# Patient Record
Sex: MALE | Race: BLACK OR AFRICAN AMERICAN | NOT HISPANIC OR LATINO | Employment: UNEMPLOYED | URBAN - METROPOLITAN AREA
[De-identification: names, ages, dates, MRNs, and addresses within clinical notes are randomized per-mention and may not be internally consistent; named-entity substitution may affect disease eponyms.]

---

## 2023-02-15 PROBLEM — I25.10 CORONARY ARTERY DISEASE INVOLVING NATIVE CORONARY ARTERY OF NATIVE HEART WITHOUT ANGINA PECTORIS: Status: ACTIVE | Noted: 2023-02-15

## 2023-02-15 PROBLEM — N18.32 STAGE 3B CHRONIC KIDNEY DISEASE (MULTI): Status: ACTIVE | Noted: 2023-02-15

## 2023-02-15 PROBLEM — R79.89 ELEVATED SERUM CREATININE: Status: ACTIVE | Noted: 2023-02-15

## 2023-02-15 PROBLEM — K76.0 FATTY LIVER: Status: ACTIVE | Noted: 2023-02-15

## 2023-02-15 PROBLEM — I50.22 ACC/AHA STAGE C CHRONIC SYSTOLIC HEART FAILURE (MULTI): Status: ACTIVE | Noted: 2023-02-15

## 2023-02-15 PROBLEM — I42.8 NON-ISCHEMIC CARDIOMYOPATHY (MULTI): Status: ACTIVE | Noted: 2023-02-15

## 2023-02-15 PROBLEM — N18.4 CHRONIC KIDNEY DISEASE, STAGE 4 (SEVERE) (MULTI): Status: ACTIVE | Noted: 2023-02-15

## 2023-02-15 PROBLEM — N42.81 PROSTATALGIA: Status: ACTIVE | Noted: 2023-02-15

## 2023-02-15 PROBLEM — I50.9 CHF (CONGESTIVE HEART FAILURE) (MULTI): Status: ACTIVE | Noted: 2023-02-15

## 2023-02-15 PROBLEM — K57.92 ACUTE DIVERTICULITIS: Status: ACTIVE | Noted: 2023-02-15

## 2023-02-15 RX ORDER — TORSEMIDE 10 MG/1
0.5 TABLET ORAL DAILY
COMMUNITY
Start: 2022-08-18 | End: 2023-06-23 | Stop reason: SDUPTHER

## 2023-02-15 RX ORDER — POTASSIUM CHLORIDE 20 MEQ/1
1 TABLET, EXTENDED RELEASE ORAL DAILY
COMMUNITY
Start: 2022-08-18 | End: 2023-03-29 | Stop reason: ALTCHOICE

## 2023-02-15 RX ORDER — NAPROXEN SODIUM 220 MG/1
TABLET, FILM COATED ORAL
COMMUNITY
Start: 2022-08-25

## 2023-02-15 RX ORDER — ATORVASTATIN CALCIUM 40 MG/1
1 TABLET, FILM COATED ORAL NIGHTLY
COMMUNITY
Start: 2022-08-18 | End: 2023-03-29 | Stop reason: SDUPTHER

## 2023-02-15 RX ORDER — SACUBITRIL AND VALSARTAN 49; 51 MG/1; MG/1
1 TABLET, FILM COATED ORAL 2 TIMES DAILY
COMMUNITY
End: 2023-06-23 | Stop reason: SDUPTHER

## 2023-02-15 RX ORDER — DAPAGLIFLOZIN 10 MG/1
1 TABLET, FILM COATED ORAL DAILY
COMMUNITY
Start: 2022-08-18 | End: 2023-06-23 | Stop reason: SDUPTHER

## 2023-02-15 RX ORDER — METOPROLOL SUCCINATE 25 MG/1
0.5 TABLET, EXTENDED RELEASE ORAL DAILY
COMMUNITY
Start: 2022-09-21 | End: 2023-06-23 | Stop reason: SDUPTHER

## 2023-02-15 RX ORDER — SPIRONOLACTONE 25 MG/1
0.5 TABLET ORAL DAILY
COMMUNITY
Start: 2022-08-18 | End: 2023-06-23 | Stop reason: SDUPTHER

## 2023-02-25 LAB
ALBUMIN (G/DL) IN SER/PLAS: 4.8 G/DL (ref 3.4–5)
ALBUMIN (MG/L) IN URINE: <7 MG/L
ALBUMIN/CREATININE (UG/MG) IN URINE: NORMAL UG/MG CRT (ref 0–30)
ANION GAP IN SER/PLAS: 15 MMOL/L (ref 10–20)
CALCIDIOL (25 OH VITAMIN D3) (NG/ML) IN SER/PLAS: 23 NG/ML
CALCIUM (MG/DL) IN SER/PLAS: 9.9 MG/DL (ref 8.6–10.6)
CARBON DIOXIDE, TOTAL (MMOL/L) IN SER/PLAS: 24 MMOL/L (ref 21–32)
CHLORIDE (MMOL/L) IN SER/PLAS: 101 MMOL/L (ref 98–107)
CREATININE (MG/DL) IN SER/PLAS: 2.44 MG/DL (ref 0.5–1.3)
CREATININE (MG/DL) IN URINE: 34.9 MG/DL (ref 20–370)
ERYTHROCYTE DISTRIBUTION WIDTH (RATIO) BY AUTOMATED COUNT: 14.2 % (ref 11.5–14.5)
ERYTHROCYTE MEAN CORPUSCULAR HEMOGLOBIN CONCENTRATION (G/DL) BY AUTOMATED: 32 G/DL (ref 32–36)
ERYTHROCYTE MEAN CORPUSCULAR VOLUME (FL) BY AUTOMATED COUNT: 87 FL (ref 80–100)
ERYTHROCYTES (10*6/UL) IN BLOOD BY AUTOMATED COUNT: 5.84 X10E12/L (ref 4.5–5.9)
GFR MALE: 28 ML/MIN/1.73M2
GLUCOSE (MG/DL) IN SER/PLAS: 137 MG/DL (ref 74–99)
HEMATOCRIT (%) IN BLOOD BY AUTOMATED COUNT: 50.9 % (ref 41–52)
HEMOGLOBIN (G/DL) IN BLOOD: 16.3 G/DL (ref 13.5–17.5)
LEUKOCYTES (10*3/UL) IN BLOOD BY AUTOMATED COUNT: 6.1 X10E9/L (ref 4.4–11.3)
NRBC (PER 100 WBCS) BY AUTOMATED COUNT: 0 /100 WBC (ref 0–0)
PARATHYRIN INTACT (PG/ML) IN SER/PLAS: 101.5 PG/ML (ref 18.5–88)
PHOSPHATE (MG/DL) IN SER/PLAS: 4.1 MG/DL (ref 2.5–4.9)
PLATELETS (10*3/UL) IN BLOOD AUTOMATED COUNT: 214 X10E9/L (ref 150–450)
POTASSIUM (MMOL/L) IN SER/PLAS: 4.7 MMOL/L (ref 3.5–5.3)
SODIUM (MMOL/L) IN SER/PLAS: 135 MMOL/L (ref 136–145)
UREA NITROGEN (MG/DL) IN SER/PLAS: 39 MG/DL (ref 6–23)

## 2023-03-29 ENCOUNTER — OFFICE VISIT (OUTPATIENT)
Dept: PRIMARY CARE | Facility: CLINIC | Age: 68
End: 2023-03-29
Payer: MEDICARE

## 2023-03-29 VITALS
HEART RATE: 99 BPM | DIASTOLIC BLOOD PRESSURE: 72 MMHG | BODY MASS INDEX: 32.5 KG/M2 | WEIGHT: 227 LBS | HEIGHT: 70 IN | SYSTOLIC BLOOD PRESSURE: 127 MMHG | TEMPERATURE: 98.3 F

## 2023-03-29 DIAGNOSIS — N18.4 CHRONIC KIDNEY DISEASE, STAGE 4 (SEVERE) (MULTI): ICD-10-CM

## 2023-03-29 DIAGNOSIS — Z23 NEED FOR VACCINATION AGAINST STREPTOCOCCUS PNEUMONIAE: ICD-10-CM

## 2023-03-29 DIAGNOSIS — Z12.11 SCREENING FOR COLON CANCER: ICD-10-CM

## 2023-03-29 DIAGNOSIS — Z12.5 SCREENING FOR PROSTATE CANCER: ICD-10-CM

## 2023-03-29 DIAGNOSIS — Z00.00 ANNUAL PHYSICAL EXAM: Primary | ICD-10-CM

## 2023-03-29 DIAGNOSIS — I50.9 CHRONIC CONGESTIVE HEART FAILURE, UNSPECIFIED HEART FAILURE TYPE (MULTI): ICD-10-CM

## 2023-03-29 DIAGNOSIS — N18.32 STAGE 3B CHRONIC KIDNEY DISEASE (MULTI): ICD-10-CM

## 2023-03-29 PROCEDURE — G0439 PPPS, SUBSEQ VISIT: HCPCS | Performed by: INTERNAL MEDICINE

## 2023-03-29 PROCEDURE — 1036F TOBACCO NON-USER: CPT | Performed by: INTERNAL MEDICINE

## 2023-03-29 PROCEDURE — 90677 PCV20 VACCINE IM: CPT | Performed by: INTERNAL MEDICINE

## 2023-03-29 PROCEDURE — 1170F FXNL STATUS ASSESSED: CPT | Performed by: INTERNAL MEDICINE

## 2023-03-29 PROCEDURE — 1159F MED LIST DOCD IN RCRD: CPT | Performed by: INTERNAL MEDICINE

## 2023-03-29 PROCEDURE — G0009 ADMIN PNEUMOCOCCAL VACCINE: HCPCS | Performed by: INTERNAL MEDICINE

## 2023-03-29 RX ORDER — ATORVASTATIN CALCIUM 40 MG/1
40 TABLET, FILM COATED ORAL NIGHTLY
Qty: 90 TABLET | Refills: 3 | Status: SHIPPED | OUTPATIENT
Start: 2023-03-29 | End: 2023-06-23 | Stop reason: SDUPTHER

## 2023-03-29 ASSESSMENT — ENCOUNTER SYMPTOMS
DEPRESSION: 0
POLYDIPSIA: 0
OCCASIONAL FEELINGS OF UNSTEADINESS: 0
SHORTNESS OF BREATH: 0
CHILLS: 0
LOSS OF SENSATION IN FEET: 0
COUGH: 0
FEVER: 0
PALPITATIONS: 0

## 2023-03-29 ASSESSMENT — PAIN SCALES - GENERAL: PAINLEVEL: 4

## 2023-03-29 ASSESSMENT — PATIENT HEALTH QUESTIONNAIRE - PHQ9
SUM OF ALL RESPONSES TO PHQ9 QUESTIONS 1 AND 2: 0
2. FEELING DOWN, DEPRESSED OR HOPELESS: NOT AT ALL
1. LITTLE INTEREST OR PLEASURE IN DOING THINGS: NOT AT ALL

## 2023-03-29 ASSESSMENT — ACTIVITIES OF DAILY LIVING (ADL)
DOING_HOUSEWORK: INDEPENDENT
BATHING: INDEPENDENT
TAKING_MEDICATION: INDEPENDENT
DRESSING: INDEPENDENT
MANAGING_FINANCES: INDEPENDENT
GROCERY_SHOPPING: INDEPENDENT

## 2023-03-29 NOTE — PROGRESS NOTES
"Subjective   Reason for Visit: Sebastian Jean Baptiste is an 67 y.o. male here for a Medicare Wellness visit.          Reviewed all medications by prescribing practitioner or clinical pharmacist (such as prescriptions, OTCs, herbal therapies and supplements) and documented in the medical record.    Patient presents today for an annual wellness exam    Medical history and surgical history updated today  Medication list reconciled and updated  Patient denies vision issues at this time  Patient denies hearing issues at this time  Current diet: elizabeth;/cardiac  Current exercise habit: yes, intense work  Follows with a dentist: Yes    Patient counseled about available preventative health vaccinations and provided with opportunity to update them with our office or through prescription to preferred pharmacy.    Reviewed and discussed preventative health screening recommendations for colon cancer    Reviewed and discussed preventative health recommendations for screening for prostate cancer          Patient Care Team:  Montez Butt MD as PCP - General     Review of Systems   Constitutional:  Negative for chills and fever.   Respiratory:  Negative for cough and shortness of breath.    Cardiovascular:  Negative for chest pain and palpitations.   Endocrine: Negative for polydipsia and polyuria.       Objective   Vitals:  /72 (BP Location: Left arm, Patient Position: Sitting, BP Cuff Size: Adult)   Pulse 99   Temp 36.8 °C (98.3 °F) (Oral)   Ht 1.778 m (5' 10\")   Wt 103 kg (227 lb)   BMI 32.57 kg/m²       Physical Exam  Constitutional:       Appearance: Normal appearance.   HENT:      Head: Normocephalic and atraumatic.      Right Ear: Tympanic membrane normal.      Left Ear: Tympanic membrane normal.      Nose: Nose normal.      Mouth/Throat:      Mouth: Mucous membranes are moist.   Eyes:      Extraocular Movements: Extraocular movements intact.      Conjunctiva/sclera: Conjunctivae normal.      Pupils: Pupils are equal, " round, and reactive to light.   Neck:      Thyroid: No thyroid mass, thyromegaly or thyroid tenderness.      Vascular: No carotid bruit.   Cardiovascular:      Rate and Rhythm: Normal rate and regular rhythm.      Heart sounds: No murmur heard.     No friction rub. No gallop.   Pulmonary:      Effort: Pulmonary effort is normal. No respiratory distress.      Breath sounds: No wheezing, rhonchi or rales.   Abdominal:      General: Bowel sounds are normal.      Palpations: Abdomen is soft.      Tenderness: There is no abdominal tenderness. There is no guarding.      Hernia: A hernia (umbilical) is present.   Musculoskeletal:      Cervical back: Neck supple. No tenderness.      Right lower leg: No edema.      Left lower leg: No edema.   Lymphadenopathy:      Cervical: No cervical adenopathy.   Skin:     Coloration: Skin is not jaundiced.   Neurological:      General: No focal deficit present.      Mental Status: He is alert and oriented to person, place, and time.   Psychiatric:         Mood and Affect: Mood normal.         Assessment/Plan   Problem List Items Addressed This Visit          Circulatory    CHF (congestive heart failure) (CMS/HCC)    Relevant Medications    atorvastatin (Lipitor) 40 mg tablet       Genitourinary    Chronic kidney disease, stage 4 (severe) (CMS/HCC)    Stage 3b chronic kidney disease    Relevant Medications    atorvastatin (Lipitor) 40 mg tablet     Other Visit Diagnoses       Annual physical exam    -  Primary    Relevant Orders    Colonoscopy    Prostate Spec.Ag,Screen    Screening for colon cancer        Relevant Orders    Colonoscopy    Screening for prostate cancer        Relevant Orders    Prostate Spec.Ag,Screen    Need for vaccination against Streptococcus pneumoniae

## 2023-06-02 ENCOUNTER — LAB (OUTPATIENT)
Dept: LAB | Facility: LAB | Age: 68
End: 2023-06-02
Payer: MEDICARE

## 2023-06-02 DIAGNOSIS — Z00.00 ANNUAL PHYSICAL EXAM: ICD-10-CM

## 2023-06-02 DIAGNOSIS — Z12.5 SCREENING FOR PROSTATE CANCER: ICD-10-CM

## 2023-06-02 LAB
ALBUMIN (G/DL) IN SER/PLAS: 4.4 G/DL (ref 3.4–5)
ALBUMIN (MG/L) IN URINE: <7 MG/L
ALBUMIN/CREATININE (UG/MG) IN URINE: NORMAL UG/MG CRT (ref 0–30)
ANION GAP IN SER/PLAS: 16 MMOL/L (ref 10–20)
CALCIDIOL (25 OH VITAMIN D3) (NG/ML) IN SER/PLAS: 59 NG/ML
CALCIUM (MG/DL) IN SER/PLAS: 9 MG/DL (ref 8.6–10.6)
CARBON DIOXIDE, TOTAL (MMOL/L) IN SER/PLAS: 23 MMOL/L (ref 21–32)
CHLORIDE (MMOL/L) IN SER/PLAS: 105 MMOL/L (ref 98–107)
CREATININE (MG/DL) IN SER/PLAS: 2.91 MG/DL (ref 0.5–1.3)
CREATININE (MG/DL) IN URINE: 102 MG/DL (ref 20–370)
GFR MALE: 23 ML/MIN/1.73M2
GLUCOSE (MG/DL) IN SER/PLAS: 105 MG/DL (ref 74–99)
PARATHYRIN INTACT (PG/ML) IN SER/PLAS: 84.5 PG/ML (ref 18.5–88)
PHOSPHATE (MG/DL) IN SER/PLAS: 4.7 MG/DL (ref 2.5–4.9)
POTASSIUM (MMOL/L) IN SER/PLAS: 4.5 MMOL/L (ref 3.5–5.3)
PROSTATE SPECIFIC ANTIGEN,SCREEN: 1.5 NG/ML (ref 0–4)
SODIUM (MMOL/L) IN SER/PLAS: 139 MMOL/L (ref 136–145)
UREA NITROGEN (MG/DL) IN SER/PLAS: 43 MG/DL (ref 6–23)

## 2023-06-02 PROCEDURE — G0103 PSA SCREENING: HCPCS

## 2023-06-02 PROCEDURE — 36415 COLL VENOUS BLD VENIPUNCTURE: CPT

## 2023-06-23 DIAGNOSIS — N18.32 STAGE 3B CHRONIC KIDNEY DISEASE (MULTI): ICD-10-CM

## 2023-06-23 DIAGNOSIS — I50.9 CHRONIC CONGESTIVE HEART FAILURE, UNSPECIFIED HEART FAILURE TYPE (MULTI): ICD-10-CM

## 2023-06-23 RX ORDER — TORSEMIDE 10 MG/1
5 TABLET ORAL DAILY
Qty: 60 TABLET | Refills: 2 | Status: SHIPPED | OUTPATIENT
Start: 2023-06-23 | End: 2023-09-05

## 2023-06-23 RX ORDER — METOPROLOL SUCCINATE 25 MG/1
12.5 TABLET, EXTENDED RELEASE ORAL DAILY
Qty: 30 TABLET | Refills: 2 | Status: SHIPPED | OUTPATIENT
Start: 2023-06-23 | End: 2023-11-09

## 2023-06-23 RX ORDER — SACUBITRIL AND VALSARTAN 49; 51 MG/1; MG/1
1 TABLET, FILM COATED ORAL 2 TIMES DAILY
Qty: 180 TABLET | Refills: 2 | Status: SHIPPED | OUTPATIENT
Start: 2023-06-23 | End: 2023-12-12 | Stop reason: SDUPTHER

## 2023-06-23 RX ORDER — SPIRONOLACTONE 25 MG/1
12.5 TABLET ORAL DAILY
Qty: 90 TABLET | Refills: 3 | Status: SHIPPED | OUTPATIENT
Start: 2023-06-23 | End: 2023-12-12 | Stop reason: SDUPTHER

## 2023-06-23 RX ORDER — DAPAGLIFLOZIN 10 MG/1
10 TABLET, FILM COATED ORAL DAILY
Qty: 90 TABLET | Refills: 2 | Status: SHIPPED | OUTPATIENT
Start: 2023-06-23 | End: 2023-12-12 | Stop reason: SDUPTHER

## 2023-06-23 RX ORDER — ATORVASTATIN CALCIUM 40 MG/1
40 TABLET, FILM COATED ORAL NIGHTLY
Qty: 90 TABLET | Refills: 3 | Status: SHIPPED | OUTPATIENT
Start: 2023-06-23 | End: 2023-12-12 | Stop reason: SDUPTHER

## 2023-08-29 DIAGNOSIS — I50.9 CHRONIC CONGESTIVE HEART FAILURE, UNSPECIFIED HEART FAILURE TYPE (MULTI): ICD-10-CM

## 2023-08-29 DIAGNOSIS — N18.32 STAGE 3B CHRONIC KIDNEY DISEASE (MULTI): ICD-10-CM

## 2023-09-05 RX ORDER — TORSEMIDE 10 MG/1
5 TABLET ORAL DAILY
Qty: 30 TABLET | Refills: 0 | Status: SHIPPED | OUTPATIENT
Start: 2023-09-05 | End: 2023-12-12 | Stop reason: SDUPTHER

## 2023-09-25 LAB
ALBUMIN (G/DL) IN SER/PLAS: 4.8 G/DL (ref 3.4–5)
ALBUMIN (MG/L) IN URINE: 7.5 MG/L
ALBUMIN/CREATININE (UG/MG) IN URINE: 21.1 UG/MG CRT (ref 0–30)
ANION GAP IN SER/PLAS: 16 MMOL/L (ref 10–20)
CALCIDIOL (25 OH VITAMIN D3) (NG/ML) IN SER/PLAS: 28 NG/ML
CALCIUM (MG/DL) IN SER/PLAS: 10.2 MG/DL (ref 8.6–10.6)
CARBON DIOXIDE, TOTAL (MMOL/L) IN SER/PLAS: 25 MMOL/L (ref 21–32)
CHLORIDE (MMOL/L) IN SER/PLAS: 103 MMOL/L (ref 98–107)
CREATININE (MG/DL) IN SER/PLAS: 2.14 MG/DL (ref 0.5–1.3)
CREATININE (MG/DL) IN URINE: 35.6 MG/DL (ref 20–370)
GFR MALE: 33 ML/MIN/1.73M2
GLUCOSE (MG/DL) IN SER/PLAS: 93 MG/DL (ref 74–99)
PARATHYRIN INTACT (PG/ML) IN SER/PLAS: 48.8 PG/ML (ref 18.5–88)
PHOSPHATE (MG/DL) IN SER/PLAS: 3.7 MG/DL (ref 2.5–4.9)
POTASSIUM (MMOL/L) IN SER/PLAS: 4.4 MMOL/L (ref 3.5–5.3)
SODIUM (MMOL/L) IN SER/PLAS: 140 MMOL/L (ref 136–145)
UREA NITROGEN (MG/DL) IN SER/PLAS: 28 MG/DL (ref 6–23)

## 2023-11-09 DIAGNOSIS — N18.32 STAGE 3B CHRONIC KIDNEY DISEASE (MULTI): ICD-10-CM

## 2023-11-09 DIAGNOSIS — I50.9 CHRONIC CONGESTIVE HEART FAILURE, UNSPECIFIED HEART FAILURE TYPE (MULTI): ICD-10-CM

## 2023-11-09 RX ORDER — METOPROLOL SUCCINATE 25 MG/1
TABLET, EXTENDED RELEASE ORAL
Qty: 45 TABLET | Refills: 1 | Status: SHIPPED | OUTPATIENT
Start: 2023-11-09 | End: 2023-12-12 | Stop reason: SDUPTHER

## 2023-12-12 ENCOUNTER — OFFICE VISIT (OUTPATIENT)
Dept: PRIMARY CARE | Facility: CLINIC | Age: 68
End: 2023-12-12
Payer: MEDICARE

## 2023-12-12 VITALS
TEMPERATURE: 98 F | WEIGHT: 230 LBS | HEART RATE: 82 BPM | HEIGHT: 69 IN | DIASTOLIC BLOOD PRESSURE: 60 MMHG | SYSTOLIC BLOOD PRESSURE: 95 MMHG | BODY MASS INDEX: 34.07 KG/M2

## 2023-12-12 DIAGNOSIS — I50.22 ACC/AHA STAGE C CHRONIC SYSTOLIC HEART FAILURE (MULTI): ICD-10-CM

## 2023-12-12 DIAGNOSIS — N18.32 STAGE 3B CHRONIC KIDNEY DISEASE (MULTI): ICD-10-CM

## 2023-12-12 DIAGNOSIS — I25.10 CORONARY ARTERY DISEASE INVOLVING NATIVE CORONARY ARTERY OF NATIVE HEART WITHOUT ANGINA PECTORIS: Primary | ICD-10-CM

## 2023-12-12 DIAGNOSIS — I50.9 CHRONIC CONGESTIVE HEART FAILURE, UNSPECIFIED HEART FAILURE TYPE (MULTI): ICD-10-CM

## 2023-12-12 PROCEDURE — 1159F MED LIST DOCD IN RCRD: CPT | Performed by: INTERNAL MEDICINE

## 2023-12-12 PROCEDURE — 1036F TOBACCO NON-USER: CPT | Performed by: INTERNAL MEDICINE

## 2023-12-12 PROCEDURE — 1125F AMNT PAIN NOTED PAIN PRSNT: CPT | Performed by: INTERNAL MEDICINE

## 2023-12-12 PROCEDURE — 1160F RVW MEDS BY RX/DR IN RCRD: CPT | Performed by: INTERNAL MEDICINE

## 2023-12-12 PROCEDURE — 99214 OFFICE O/P EST MOD 30 MIN: CPT | Performed by: INTERNAL MEDICINE

## 2023-12-12 RX ORDER — ATORVASTATIN CALCIUM 40 MG/1
40 TABLET, FILM COATED ORAL NIGHTLY
Qty: 90 TABLET | Refills: 3 | Status: SHIPPED | OUTPATIENT
Start: 2023-12-12 | End: 2024-12-11

## 2023-12-12 RX ORDER — METOPROLOL SUCCINATE 25 MG/1
25 TABLET, EXTENDED RELEASE ORAL DAILY
Qty: 45 TABLET | Refills: 3 | Status: SHIPPED | OUTPATIENT
Start: 2023-12-12 | End: 2024-06-05

## 2023-12-12 RX ORDER — SPIRONOLACTONE 25 MG/1
25 TABLET ORAL DAILY
Qty: 90 TABLET | Refills: 3 | Status: SHIPPED | OUTPATIENT
Start: 2023-12-12

## 2023-12-12 RX ORDER — SACUBITRIL AND VALSARTAN 49; 51 MG/1; MG/1
1 TABLET, FILM COATED ORAL 2 TIMES DAILY
Qty: 180 TABLET | Refills: 2 | Status: SHIPPED | OUTPATIENT
Start: 2023-12-12 | End: 2024-01-30 | Stop reason: SDUPTHER

## 2023-12-12 RX ORDER — DAPAGLIFLOZIN 10 MG/1
10 TABLET, FILM COATED ORAL DAILY
Qty: 90 TABLET | Refills: 2 | Status: SHIPPED | OUTPATIENT
Start: 2023-12-12

## 2023-12-12 RX ORDER — TORSEMIDE 10 MG/1
5 TABLET ORAL DAILY PRN
Qty: 30 TABLET | Refills: 3 | Status: SHIPPED | OUTPATIENT
Start: 2023-12-12 | End: 2024-03-18

## 2023-12-12 ASSESSMENT — ENCOUNTER SYMPTOMS
SHORTNESS OF BREATH: 0
COUGH: 0
CHILLS: 0
PALPITATIONS: 0
POLYDIPSIA: 0
FEVER: 0

## 2023-12-12 NOTE — PROGRESS NOTES
Subjective   Patient ID: Sebastian Jean Baptiste is a 68 y.o. male who presents for Med Refill and Follow-up.    68-year-old male presents today for follow-up.  He is been lost to follow-up in regards to his heart health he is maintained on his medications consistently since the diagnosis of reduced ejection fraction heart failure in 2022 with an ejection fraction of 20-25%.  His current medications seem to be doing well as he reports no symptoms of orthopnea paroxysmal nocturnal dyspnea shortness of breath chest pain or peripheral edema.  His weight has been very stable and unchanged over the course of this last year according to the records I have.  He reports no abnormal weight gain at home.  He does not feel limited in his exercise capacity, at this time.  He is overdue for follow-up on echocardiogram based on his ejection fraction in 2022.  He has no pacemaker at this time.  He is lost to follow-up with cardiology due to his previous heart doctor being unavailable to him at this time.  We discussed the echocardiogram and the need for follow-up to reevaluate his ejection fraction and as part of the assessment of the success of his heart failure treatment.  He is asymptomatic so that is already a major success and he has not been rehospitalized another 6 hours but we need to reevaluate his ejection fraction to determine if a protective ICD is necessary long-term.  I also made a referral to cardiology specifically a physician who is known to work in our [facility] heart failure center.  I advised him for 6-month follow-up.  I did review the blood work available from his specialist physician in nephrology    Med Refill  Pertinent negatives include no chest pain, chills, coughing or fever.        Review of Systems   Constitutional:  Negative for chills and fever.   Respiratory:  Negative for cough and shortness of breath.    Cardiovascular:  Negative for chest pain, palpitations and leg swelling.   Endocrine: Negative for  "polydipsia and polyuria.       Objective   BP 95/60 (BP Location: Right arm, Patient Position: Sitting, BP Cuff Size: Large adult)   Pulse 82   Temp 36.7 °C (98 °F) (Temporal)   Ht 1.753 m (5' 9\")   Wt 104 kg (230 lb)   BMI 33.97 kg/m²     Physical Exam  Constitutional:       Appearance: Normal appearance.   HENT:      Head: Normocephalic and atraumatic.   Eyes:      Extraocular Movements: Extraocular movements intact.      Pupils: Pupils are equal, round, and reactive to light.   Neck:      Thyroid: No thyroid mass or thyromegaly.      Vascular: No carotid bruit.   Cardiovascular:      Rate and Rhythm: Normal rate and regular rhythm.      Heart sounds: No murmur heard.     No friction rub. No gallop.   Pulmonary:      Effort: No respiratory distress.      Breath sounds: No wheezing, rhonchi or rales.   Musculoskeletal:      Cervical back: Neck supple.      Right lower leg: No edema.      Left lower leg: No edema.   Lymphadenopathy:      Cervical: No cervical adenopathy.   Neurological:      Mental Status: He is alert.         Assessment/Plan   Problem List Items Addressed This Visit             ICD-10-CM    ACC/AHA stage C chronic systolic heart failure (CMS/HCC) I50.22    Relevant Medications    sacubitriL-valsartan (Entresto) 49-51 mg tablet    metoprolol succinate XL (Toprol-XL) 25 mg 24 hr tablet    Other Relevant Orders    Referral to Cardiology    Transthoracic Echo (TTE) Complete    CHF (congestive heart failure) (CMS/HCC) I50.9    Relevant Medications    dapagliflozin propanediol (Farxiga) 10 mg    sacubitriL-valsartan (Entresto) 49-51 mg tablet    metoprolol succinate XL (Toprol-XL) 25 mg 24 hr tablet    spironolactone (Aldactone) 25 mg tablet    atorvastatin (Lipitor) 40 mg tablet    torsemide (Demadex) 10 mg tablet    Other Relevant Orders    Referral to Cardiology    Transthoracic Echo (TTE) Complete    Coronary artery disease involving native coronary artery of native heart without angina pectoris " - Primary I25.10    Relevant Medications    sacubitriL-valsartan (Entresto) 49-51 mg tablet    metoprolol succinate XL (Toprol-XL) 25 mg 24 hr tablet    Stage 3b chronic kidney disease (CMS/HCC) N18.32    Relevant Medications    dapagliflozin propanediol (Farxiga) 10 mg    sacubitriL-valsartan (Entresto) 49-51 mg tablet    metoprolol succinate XL (Toprol-XL) 25 mg 24 hr tablet    spironolactone (Aldactone) 25 mg tablet    atorvastatin (Lipitor) 40 mg tablet    torsemide (Demadex) 10 mg tablet

## 2023-12-14 DIAGNOSIS — N18.4 CHRONIC KIDNEY DISEASE, STAGE 4 (SEVERE) (MULTI): Primary | ICD-10-CM

## 2023-12-18 ENCOUNTER — OFFICE VISIT (OUTPATIENT)
Dept: NEPHROLOGY | Facility: CLINIC | Age: 68
End: 2023-12-18
Payer: MEDICARE

## 2023-12-18 ENCOUNTER — LAB (OUTPATIENT)
Dept: LAB | Facility: LAB | Age: 68
End: 2023-12-18
Payer: MEDICARE

## 2023-12-18 VITALS
TEMPERATURE: 98.3 F | DIASTOLIC BLOOD PRESSURE: 64 MMHG | SYSTOLIC BLOOD PRESSURE: 97 MMHG | HEART RATE: 89 BPM | BODY MASS INDEX: 34.41 KG/M2 | WEIGHT: 233 LBS

## 2023-12-18 DIAGNOSIS — N18.4 CHRONIC KIDNEY DISEASE, STAGE 4 (SEVERE) (MULTI): Primary | ICD-10-CM

## 2023-12-18 DIAGNOSIS — N18.4 CHRONIC KIDNEY DISEASE, STAGE 4 (SEVERE) (MULTI): ICD-10-CM

## 2023-12-18 LAB
25(OH)D3 SERPL-MCNC: 27 NG/ML (ref 30–100)
ALBUMIN SERPL BCP-MCNC: 4.8 G/DL (ref 3.4–5)
ANION GAP SERPL CALC-SCNC: 16 MMOL/L (ref 10–20)
BUN SERPL-MCNC: 39 MG/DL (ref 6–23)
CALCIUM SERPL-MCNC: 9.5 MG/DL (ref 8.6–10.6)
CHLORIDE SERPL-SCNC: 102 MMOL/L (ref 98–107)
CO2 SERPL-SCNC: 26 MMOL/L (ref 21–32)
CREAT SERPL-MCNC: 2.64 MG/DL (ref 0.5–1.3)
GFR SERPL CREATININE-BSD FRML MDRD: 25 ML/MIN/1.73M*2
GLUCOSE SERPL-MCNC: 115 MG/DL (ref 74–99)
PHOSPHATE SERPL-MCNC: 4.3 MG/DL (ref 2.5–4.9)
POTASSIUM SERPL-SCNC: 4.1 MMOL/L (ref 3.5–5.3)
PTH-INTACT SERPL-MCNC: 63.3 PG/ML (ref 18.5–88)
SODIUM SERPL-SCNC: 140 MMOL/L (ref 136–145)

## 2023-12-18 PROCEDURE — 82043 UR ALBUMIN QUANTITATIVE: CPT

## 2023-12-18 PROCEDURE — 1036F TOBACCO NON-USER: CPT | Performed by: INTERNAL MEDICINE

## 2023-12-18 PROCEDURE — 1125F AMNT PAIN NOTED PAIN PRSNT: CPT | Performed by: INTERNAL MEDICINE

## 2023-12-18 PROCEDURE — 82306 VITAMIN D 25 HYDROXY: CPT

## 2023-12-18 PROCEDURE — 99214 OFFICE O/P EST MOD 30 MIN: CPT | Performed by: INTERNAL MEDICINE

## 2023-12-18 PROCEDURE — 1159F MED LIST DOCD IN RCRD: CPT | Performed by: INTERNAL MEDICINE

## 2023-12-18 PROCEDURE — 1160F RVW MEDS BY RX/DR IN RCRD: CPT | Performed by: INTERNAL MEDICINE

## 2023-12-18 PROCEDURE — 80069 RENAL FUNCTION PANEL: CPT

## 2023-12-18 PROCEDURE — 83970 ASSAY OF PARATHORMONE: CPT

## 2023-12-18 PROCEDURE — 82570 ASSAY OF URINE CREATININE: CPT

## 2023-12-18 PROCEDURE — 36415 COLL VENOUS BLD VENIPUNCTURE: CPT

## 2023-12-18 NOTE — PROGRESS NOTES
For follow up, doing well.  No complaints  No hospitalizations/illness since last visit  Not checking BP at home  Denies orthostatic symptoms  Is not taking Torsemide daily, only as needed    RoS negative for all other systems except as noted above.    Vitals:    12/18/23 1423   BP: 97/64   Pulse: 89   Temp: 36.8 °C (98.3 °F)       No distress  HEENT:  moist, no pallor  No edema filiberto LE  Breath sounds filiberto equal, clear  S1 S2 regular, normal, no rub or murmur  Abdomen soft, non tender  AAO x3, non focal    No labs since 9/25/23      68-year-old with history of nonischemic cardiomyopathy with HFrEF and CKD G3A1.     #CKD G3A1: Labs from 9/25/2023 show creatinine 2.14 mg per DL, EGFR 33 ml/min, no labs since then, advised to repeat labs today. Knows to be cautious with NSAIDs. Continue Farxiga 10 mg/day, tolerating well     #Bone mineral metabolism: check today, Ct daily over-the-counter vitamin D 1000 units.        #Hypertension: Goal blood pressure 120/80 mmHg, below goal. Denies orthostatic symptoms. Continue Entresto 49-51 mg 1 tablet twice a day, metoprolol succinate ER 25 mg one tablet daily, spironolactone 25 mg daily Continue 2.5 g/day sodium restricted diet.     RTC: 3-4 mo with repeat labs today

## 2023-12-19 LAB
CREAT UR-MCNC: 82.5 MG/DL (ref 20–370)
MICROALBUMIN UR-MCNC: <7 MG/L
MICROALBUMIN/CREAT UR: NORMAL MG/G{CREAT}

## 2023-12-22 ENCOUNTER — TELEMEDICINE (OUTPATIENT)
Dept: PHARMACY | Facility: HOSPITAL | Age: 68
End: 2023-12-22
Payer: MEDICARE

## 2023-12-22 DIAGNOSIS — N18.4 CHRONIC KIDNEY DISEASE, STAGE 4 (SEVERE) (MULTI): ICD-10-CM

## 2023-12-22 NOTE — PROGRESS NOTES
"Subjective   Patient ID: Sebastian Jean Baptiste is a 68 y.o. male who presents for No chief complaint on file..    Referring Provider: Dr Alisha DONNELLY  HPI: CKD stage 4. last EGFR 25 sCr 2.64     HTN: controlled  BP: 97/64 at last ov  Medications  Entresto 49-51 bid  Spironolactone 25mg every day  Metoprolol 25mg er every day    glucose: well controlled and monitored    HLD: no recent lab draws  LDL  On statin? Atorvastatin 40mg    Medications reviewed for appropriate dosing in setting of CKD  Recommended changes:  - no adjustments needed at this time      There were no vitals taken for this visit.     Labs  Lab Results   Component Value Date    BILITOT 0.5 11/20/2022    CALCIUM 9.5 12/18/2023    CO2 26 12/18/2023     12/18/2023    CREATININE 2.64 (H) 12/18/2023    GLUCOSE 115 (H) 12/18/2023    ALKPHOS 58 11/20/2022    K 4.1 12/18/2023    PROT 7.6 11/20/2022     12/18/2023    AST 20 11/20/2022    ALT 19 11/20/2022    BUN 39 (H) 12/18/2023    ANIONGAP 16 12/18/2023    MG 2.10 08/18/2022    PHOS 4.3 12/18/2023    ALBUMIN 4.8 12/18/2023    LIPASE 146 (H) 12/26/2021    GFRMALE 33 (A) 09/25/2023     No results found for: \"TRIG\", \"CHOL\", \"LDLCALC\", \"HDL\"  No results found for: \"HGBA1C\"    Current Outpatient Medications on File Prior to Visit   Medication Sig Dispense Refill    aspirin 81 mg chewable tablet USE AS DIRECTED.      atorvastatin (Lipitor) 40 mg tablet Take 1 tablet (40 mg) by mouth once daily at bedtime. 90 tablet 3    dapagliflozin propanediol (Farxiga) 10 mg Take 1 tablet (10 mg) by mouth once daily. 90 tablet 2    metoprolol succinate XL (Toprol-XL) 25 mg 24 hr tablet Take 1 tablet (25 mg) by mouth once daily. Do not crush or chew. 45 tablet 3    sacubitriL-valsartan (Entresto) 49-51 mg tablet Take 1 tablet by mouth 2 times a day. 180 tablet 2    spironolactone (Aldactone) 25 mg tablet Take 1 tablet (25 mg) by mouth once daily. 90 tablet 3    torsemide (Demadex) 10 mg tablet Take 0.5 tablets (5 mg) by " mouth once daily as needed (swelling or weight gain of 3 or more pounds). (Patient not taking: Reported on 12/18/2023) 30 tablet 3     No current facility-administered medications on file prior to visit.        Assessment/Plan   PATIENT EDUCATION/DISCUSSION:  - Counseled patient on MOA, expectations, side effects, duration of therapy, contraindications, administration, and monitoring parameters  - Answered all patient questions and concerns  - likely eligible for patient assistance will drop off 1040 at [facility] pharmacy. Will coordinate patient assistance for both farxiga and entresto  _______________________________________________________________________  PLAN  1. CONTINUE Farxiga and Entresto  2. Prescription sent to [facility] pharmacy for assistance on authorization and copay. Medication will be mailed to patient.    Aristeo Puri, PharmD    Continue all meds under the continuation of care with the referring provider and clinical pharmacy team.

## 2023-12-28 ENCOUNTER — HOSPITAL ENCOUNTER (OUTPATIENT)
Dept: CARDIOLOGY | Facility: HOSPITAL | Age: 68
Discharge: HOME | End: 2023-12-28
Payer: MEDICARE

## 2023-12-28 DIAGNOSIS — Z01.89 ENCOUNTER FOR OTHER SPECIFIED SPECIAL EXAMINATIONS: ICD-10-CM

## 2023-12-28 DIAGNOSIS — I50.9 CHRONIC CONGESTIVE HEART FAILURE, UNSPECIFIED HEART FAILURE TYPE (MULTI): ICD-10-CM

## 2023-12-28 DIAGNOSIS — I50.22 ACC/AHA STAGE C CHRONIC SYSTOLIC HEART FAILURE (MULTI): ICD-10-CM

## 2023-12-28 LAB
AORTIC VALVE MEAN GRADIENT: 2.7
AORTIC VALVE PEAK VELOCITY: 1.03
AV PEAK GRADIENT: 4.3
AVA (PEAK VEL): 1.85
AVA (VTI): 1.59
EJECTION FRACTION APICAL 4 CHAMBER: 19.9
EJECTION FRACTION: 25
LEFT ATRIUM VOLUME AREA LENGTH INDEX BSA: 20.2
LEFT VENTRICLE INTERNAL DIMENSION DIASTOLE: 6.19 (ref 3.5–6)
LEFT VENTRICULAR OUTFLOW TRACT DIAMETER: 2.01
MITRAL VALVE E/A RATIO: 0.67
RIGHT VENTRICLE PEAK SYSTOLIC PRESSURE: 24.9
TRICUSPID ANNULAR PLANE SYSTOLIC EXCURSION: 2.9

## 2023-12-28 PROCEDURE — 93306 TTE W/DOPPLER COMPLETE: CPT | Performed by: INTERNAL MEDICINE

## 2023-12-28 PROCEDURE — 93306 TTE W/DOPPLER COMPLETE: CPT

## 2023-12-28 PROCEDURE — 2500000004 HC RX 250 GENERAL PHARMACY W/ HCPCS (ALT 636 FOR OP/ED): Performed by: INTERNAL MEDICINE

## 2023-12-28 RX ADMIN — PERFLUTREN 3 ML OF DILUTION: 6.52 INJECTION, SUSPENSION INTRAVENOUS at 10:56

## 2024-01-15 ENCOUNTER — TELEPHONE (OUTPATIENT)
Dept: PHARMACY | Facility: HOSPITAL | Age: 69
End: 2024-01-15
Payer: MEDICARE

## 2024-01-15 NOTE — TELEPHONE ENCOUNTER
Lvm to follow up on 1040 form to apply for patient assistance for farxiga/entresto    Pt call back reports now on wife's insurance plan and both farxiga and entresto very affordable no need to continue patient assistance application process at this time.

## 2024-01-30 ENCOUNTER — OFFICE VISIT (OUTPATIENT)
Dept: CARDIOLOGY | Facility: CLINIC | Age: 69
End: 2024-01-30
Payer: MEDICARE

## 2024-01-30 VITALS
WEIGHT: 229 LBS | SYSTOLIC BLOOD PRESSURE: 114 MMHG | HEART RATE: 90 BPM | OXYGEN SATURATION: 93 % | HEIGHT: 70 IN | BODY MASS INDEX: 32.78 KG/M2 | DIASTOLIC BLOOD PRESSURE: 67 MMHG

## 2024-01-30 DIAGNOSIS — I50.22 ACC/AHA STAGE C CHRONIC SYSTOLIC HEART FAILURE (MULTI): ICD-10-CM

## 2024-01-30 DIAGNOSIS — I50.9 CHRONIC CONGESTIVE HEART FAILURE, UNSPECIFIED HEART FAILURE TYPE (MULTI): ICD-10-CM

## 2024-01-30 DIAGNOSIS — N18.32 STAGE 3B CHRONIC KIDNEY DISEASE (MULTI): ICD-10-CM

## 2024-01-30 DIAGNOSIS — I42.8 NON-ISCHEMIC CARDIOMYOPATHY (MULTI): Primary | ICD-10-CM

## 2024-01-30 PROBLEM — Z86.16 PERSONAL HISTORY OF COVID-19: Status: ACTIVE | Noted: 2022-08-18

## 2024-01-30 PROCEDURE — 99214 OFFICE O/P EST MOD 30 MIN: CPT | Performed by: INTERNAL MEDICINE

## 2024-01-30 PROCEDURE — 93005 ELECTROCARDIOGRAM TRACING: CPT | Performed by: INTERNAL MEDICINE

## 2024-01-30 PROCEDURE — 93010 ELECTROCARDIOGRAM REPORT: CPT | Performed by: INTERNAL MEDICINE

## 2024-01-30 PROCEDURE — 1126F AMNT PAIN NOTED NONE PRSNT: CPT | Performed by: INTERNAL MEDICINE

## 2024-01-30 PROCEDURE — 1159F MED LIST DOCD IN RCRD: CPT | Performed by: INTERNAL MEDICINE

## 2024-01-30 PROCEDURE — 1036F TOBACCO NON-USER: CPT | Performed by: INTERNAL MEDICINE

## 2024-01-30 RX ORDER — SACUBITRIL AND VALSARTAN 49; 51 MG/1; MG/1
1 TABLET, FILM COATED ORAL 2 TIMES DAILY
Qty: 180 TABLET | Refills: 2 | Status: SHIPPED | OUTPATIENT
Start: 2024-01-30

## 2024-01-30 ASSESSMENT — ENCOUNTER SYMPTOMS
LOSS OF SENSATION IN FEET: 0
OCCASIONAL FEELINGS OF UNSTEADINESS: 0
DEPRESSION: 0

## 2024-01-30 ASSESSMENT — PAIN SCALES - GENERAL: PAINLEVEL: 0-NO PAIN

## 2024-01-30 ASSESSMENT — COLUMBIA-SUICIDE SEVERITY RATING SCALE - C-SSRS
1. IN THE PAST MONTH, HAVE YOU WISHED YOU WERE DEAD OR WISHED YOU COULD GO TO SLEEP AND NOT WAKE UP?: NO
6. HAVE YOU EVER DONE ANYTHING, STARTED TO DO ANYTHING, OR PREPARED TO DO ANYTHING TO END YOUR LIFE?: NO
2. HAVE YOU ACTUALLY HAD ANY THOUGHTS OF KILLING YOURSELF?: NO

## 2024-01-30 NOTE — PROGRESS NOTES
Primary Care Physician: Montez Butt MD  Date of Visit: 01/30/2024  2:00 PM EST  [place] [facility] [place] [address]     Chief Complaint:   No chief complaint on file.       HPI / Summary:   Sebastian Jean Baptiste is a 68 y.o. male presents for followup.   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  No cannabis.    No CP, edema, no orthopnea.  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    Sleeps in a recliner.  Disabled daughter, post anoxic brain injury at birth.  Generally feels well no specific complaints  Specialty Problems          Cardiology Problems    ACC/AHA stage C chronic systolic heart failure (CMS/HCC)    CHF (congestive heart failure) (CMS/HCC)    Coronary artery disease involving native coronary artery of native heart without angina pectoris    Non-ischemic cardiomyopathy (CMS/HCC)        Past Medical History:   Diagnosis Date    Other specified health status     No pertinent past medical history          Past Surgical History:   Procedure Laterality Date    OTHER SURGICAL HISTORY  10/26/2022    No history of surgery          Social History:   reports that he has never smoked. He has never been exposed to tobacco smoke. He has never used smokeless tobacco. He reports that he does not drink alcohol and does not use drugs.      Allergies:  No Known Allergies    Outpatient Medications:  Current Outpatient Medications   Medication Instructions    aspirin 81 mg chewable tablet USE AS DIRECTED.    atorvastatin (LIPITOR) 40 mg, oral, Nightly    dapagliflozin propanediol (FARXIGA) 10 mg, oral, Daily    ergocalciferol, vitamin D2, (VITAMIN D2 ORAL) 2 %, oral, Daily    metoprolol succinate XL (TOPROL-XL) 25 mg, oral, Daily, Do not crush or  "chew.    sacubitriL-valsartan (Entresto) 49-51 mg tablet 1 tablet, oral, 2 times daily    spironolactone (ALDACTONE) 25 mg, oral, Daily    torsemide (DEMADEX) 5 mg, oral, Daily PRN       ROS     Physical Exam:  Vitals:    01/30/24 1423 01/30/24 1425   BP: 94/61 114/67   BP Location: Left arm Right arm   Patient Position: Sitting Sitting   Pulse: 90    SpO2: 93%    Weight: 104 kg (229 lb)    Height: 1.778 m (5' 10\")      Wt Readings from Last 5 Encounters:   01/30/24 104 kg (229 lb)   12/18/23 106 kg (233 lb)   12/12/23 104 kg (230 lb)   06/05/23 103 kg (227 lb 2 oz)   03/29/23 103 kg (227 lb)     Body mass index is 32.86 kg/m².     Well-developed male no acute distress.  Flat JVP.  Normal carotid upstrokes.  Regular rhythm no gallop no murmur.  Lungs are clear without crackles or wheezes.  Abdomen soft without masses.  No dependent edema with intact pedal pulses  Last Labs:  CMP:  Recent Labs     12/18/23  1510 09/25/23  1152 06/02/23  1820 02/25/23  1140 11/20/22 1948    140 139 135* 137   K 4.1 4.4 4.5 4.7 4.8    103 105 101 103   CO2 26 25 23 24 28   ANIONGAP 16 16 16 15 11   BUN 39* 28* 43* 39* 28*   CREATININE 2.64* 2.14* 2.91* 2.44* 2.43*   EGFR 25*  --   --   --   --    GLUCOSE 115* 93 105* 137* 92     Recent Labs     12/18/23  1510 09/25/23  1152 06/02/23  1820 02/25/23  1140 11/20/22  1948 09/10/22  1100 08/25/22  1500 08/15/22  2159 12/26/21  1130 07/08/19  0938 06/16/19 2000   ALBUMIN 4.8 4.8 4.4 4.8 4.5 4.6 4.5 3.9 4.0   < > 4.1   ALKPHOS  --   --   --   --  58 66 80 82 46   < > 66   ALT  --   --   --   --  19 21 39 54* 22   < > 16   AST  --   --   --   --  20 17 32 37 22   < > 15   BILITOT  --   --   --   --  0.5 0.5 0.6 0.9 0.8   < > 0.6   LIPASE  --   --   --   --   --   --   --   --  146*  --  74    < > = values in this interval not displayed.     CBC:  Recent Labs     02/25/23  1140 11/20/22  1948 08/25/22  1500 08/18/22  0511 08/17/22  0618   WBC 6.1 6.7 6.4 6.9 5.7   HGB 16.3 16.1 " "17.2 15.5 13.6   HCT 50.9 49.1 52.5* 46.0 41.6    222 273 244 236   MCV 87 83 85 82 83     COAG: No results for input(s): \"INR\", \"DDIMERVTE\" in the last 63828 hours.  HEME/ENDO:  Recent Labs     08/18/22  0511   FERRITIN 161   IRONSAT 17*      CARDIAC:   Recent Labs     11/20/22  2114 11/20/22  1948 10/19/22  1130 08/16/22  0023 08/15/22  2159   TROPHS 20 21*  --  31* 34*   BNP  --  47 94  --  552*   No results for input(s): \"CHOL\", \"LDLF\", \"HDL\", \"TRIG\" in the last 76870 hours.    Last Cardiology Tests:  ECG:      Echo:  Echo Results:  Transthoracic Echo (TTE) Complete With Contrast 12/28/2023    [facility], [address] [place] [address]  Tel [phone] and Fax [phone]    TRANSTHORACIC ECHOCARDIOGRAM REPORT      Patient Name:      MARISELA Gruber Physician:    [id number] Javon Marin DO  Study Date:        12/28/2023          Ordering Provider:    [id number] DARRYN LLOYD  MRN/PID:           [id number]            Fellow:  Accession#:        [id number]        Nurse:                Sundeep Hart RN  Date of Birth/Age: 1955 / 68     Sonographer:          Che Mcnair RDCS  years  Gender:            M                   Additional Staff:  Height:            175.00 cm           Admit Date:           12/28/2023  Weight:            106.00 kg           Admission Status:     Outpatient  BSA:               2.20 m2             Encounter#:           [id number]  Department Location:  [facility] Non  Invasive  Blood Pressure: 97 /64 mmHg    Study Type:    TRANSTHORACIC ECHO (TTE) COMPLETE  Diagnosis/ICD: Encounter for other specified special examinations-Z01.89;  Chronic systolic (congestive) heart failure (CHF)-I50.22  Indication:    reduced EF, CHF, EF20% lost to follow up, chronic systolic heart  failure  CPT Code:      Echo Complete w Full Doppler-65753    Patient History:  Pertinent History: CHF, CAD and Cardiomyopathy.    Study Detail: The following Echo " studies were performed: 2D, M-Mode, Doppler and  color flow. Technically challenging study due to poor acoustic  windows and body habitus. Definity used as a contrast agent for  endocardial border definition. Total contrast used for this  procedure was 2 mL via IV push.      PHYSICIAN INTERPRETATION:  Left Ventricle: The left ventricular systolic function is moderately decreased, with an estimated ejection fraction of 30-35%. There is global hypokinesis of the left ventricle with minor regional variations. The left ventricular cavity size is mild to moderately dilated. Spectral Doppler shows an impaired relaxation pattern of left ventricular diastolic filling.  Left Atrium: The left atrium is normal in size.  Right Ventricle: The right ventricle is normal in size. There is normal right ventricular global systolic function.  Right Atrium: The right atrium is mildly dilated.  Aortic Valve: The aortic valve appears structurally normal. There is no evidence of aortic valve regurgitation. The peak instantaneous gradient of the aortic valve is 4.3 mmHg. The mean gradient of the aortic valve is 2.7 mmHg.  Mitral Valve: The mitral valve is normal in structure. There is mild mitral valve regurgitation.  Tricuspid Valve: The tricuspid valve is structurally normal. There is trace to mild tricuspid regurgitation. The Doppler estimated RVSP is within normal limits at 24.9 mmHg.  Pulmonic Valve: The pulmonic valve is structurally normal. There is no indication of pulmonic valve regurgitation.  Pericardium: There is no pericardial effusion noted.  Aorta: The aortic root is normal.  Systemic Veins: The inferior vena cava appears to be of normal size.  In comparison to the previous echocardiogram(s): Compared with study from 8/16/2022, LV function has improved (prior EF 20-25%).      CONCLUSIONS:  1. Left ventricular systolic function is moderately decreased with a 30-35% estimated ejection fraction.  2. Spectral Doppler shows an  impaired relaxation pattern of left ventricular diastolic filling.  3. Mild mitral valve regurgitation.  4. RVSP within normal limits.  5. There is global hypokinesis of the left ventricle with minor regional variations.    QUANTITATIVE DATA SUMMARY:  2D MEASUREMENTS:  Normal Ranges:  LAs:           5.15 cm    (2.7-4.0cm)  IVSd:          1.15 cm    (0.6-1.1cm)  LVPWd:         1.18 cm    (0.6-1.1cm)  LVIDd:         6.19 cm    (3.9-5.9cm)  LVIDs:         5.14 cm  LV Mass Index: 198.5 g/m2  LV % FS        17.0 %    LA VOLUME:  Normal Ranges:  LA Vol A4C:        57.1 ml    (22+/-6mL/m2)  LA Vol A2C:        34.6 ml  LA Vol BP:         44.5 ml  LA Vol Index A4C:  25.9 ml/m2  LA Vol Index A2C:  15.7 ml/m2  LA Vol Index BP:   20.2 ml/m2  LA Area A4C:       19.4 cm2  LA Area A2C:       15.1 cm2  LA Major Axis A4C: 5.6 cm  LA Major Axis A2C: 5.6 cm  LA Volume Index:   20.1 ml/m2  LA Vol A4C:        55.0 ml  LA Vol A2C:        33.5 ml    RA VOLUME BY A/L METHOD:  Normal Ranges:  RA Area A4C: 21.0 cm2    M-MODE MEASUREMENTS:  Normal Ranges:  Ao Root: 3.60 cm (2.0-3.7cm)  LAs:     4.58 cm (2.7-4.0cm)    AORTA MEASUREMENTS:  Normal Ranges:  Ao Sinus, d: 3.20 cm (2.1-3.5cm)  Ao STJ, d:   3.30 cm (1.7-3.4cm)  Asc Ao, d:   3.50 cm (2.1-3.4cm)    LV SYSTOLIC FUNCTION BY 2D PLANIMETRY (MOD):  Normal Ranges:  EF-A4C View: 19.9 % (>=55%)  EF-A2C View: 28.5 %  EF-Biplane:  25.1 %    LV DIASTOLIC FUNCTION:  Normal Ranges:  MV Peak E:        0.40 m/s    (0.7-1.2 m/s)  MV Peak A:        0.60 m/s    (0.42-0.7 m/s)  E/A Ratio:        0.67        (1.0-2.2)  MV lateral e'     0.05 m/s  MV medial e'      0.05 m/s  MV A Dur:         119.95 msec  PulmV Sys Magno:    32.09 cm/s  PulmV Logan Magno:   35.91 cm/s  PulmV S/D Magno:    0.89  PulmV A Revs Magno: 22.24 cm/s  PulmV A Revs Dur: 94.58 msec    MITRAL VALVE:  Normal Ranges:  MV DT: 142 msec (150-240msec)    AORTIC VALVE:  Normal Ranges:  AoV Vmax:                1.03 m/s (<=1.7m/s)  AoV Peak PG:              4.3 mmHg (<20mmHg)  AoV Mean P.7 mmHg (1.7-11.5mmHg)  LVOT Max Magno:            0.60 m/s (<=1.1m/s)  AoV VTI:                 20.18 cm (18-25cm)  LVOT VTI:                10.16 cm  LVOT Diameter:           2.01 cm  (1.8-2.4cm)  AoV Area, VTI:           1.59 cm2 (2.5-5.5cm2)  AoV Area,Vmax:           1.85 cm2 (2.5-4.5cm2)  AoV Dimensionless Index: 0.50      RIGHT VENTRICLE:  RV Basal 4.40 cm  RV Mid   3.30 cm  RV Major 8.6 cm  TAPSE:   29.0 mm    TRICUSPID VALVE/RVSP:  Normal Ranges:  Peak TR Velocity: 2.34 m/s  Est. RA Pressure: 3 mmHg  RV Syst Pressure: 24.9 mmHg (< 30mmHg)  IVC Diam:         1.40 cm    PULMONIC VALVE:  Normal Ranges:  PV Accel Time: 97 msec  (>120ms)  PV Max Magno:    0.6 m/s  (0.6-0.9m/s)  PV Max P.4 mmHg    Pulmonary Veins:  PulmV A Revs Dur: 94.58 msec  PulmV A Revs Magno: 22.24 cm/s  PulmV Logan Magno:   35.91 cm/s  PulmV S/D Magno:    0.89  PulmV Sys Magno:    32.09 cm/s      [id number] Javon Marin DO  Electronically signed on 2023 at 1:19:43 PM        ** Final **       Cath:      Stress Test:  Stress Results:  No results found for this or any previous visit from the past 365 days.         Cardiac Imaging:  Transthoracic Echo (TTE) Complete     [facility], [address] [place] [address]               Tel [phone] and Fax [phone]    TRANSTHORACIC ECHOCARDIOGRAM REPORT       Patient Name:      MARISELA Gruber Physician:    [id number] Javon Marin DO  Study Date:        2023          Ordering Provider:    [id number] DARRYN LLOYD  MRN/PID:           [id number]            Fellow:  Accession#:        [id number]        Nurse:                Sundeep Hart RN  Date of Birth/Age: 1955 / 68     Sonographer:          Che Mcnair CS                      years  Gender:            M                   Additional Staff:  Height:            175.00 cm           Admit Date:           12/28/2023  Weight:            106.00 kg           Admission Status:     Outpatient  BSA:               2.20 m2             Encounter#:           [id number]                                         Department Location:  [facility] Non                                                               Invasive  Blood Pressure: 97 /64 mmHg    Study Type:    TRANSTHORACIC ECHO (TTE) COMPLETE  Diagnosis/ICD: Encounter for other specified special examinations-Z01.89;                 Chronic systolic (congestive) heart failure (CHF)-I50.22  Indication:    reduced EF, CHF, EF20% lost to follow up, chronic systolic heart                 failure  CPT Code:      Echo Complete w Full Doppler-08074    Patient History:  Pertinent History: CHF, CAD and Cardiomyopathy.    Study Detail: The following Echo studies were performed: 2D, M-Mode, Doppler and                color flow. Technically challenging study due to poor acoustic                windows and body habitus. Definity used as a contrast agent for                endocardial border definition. Total contrast used for this                procedure was 2 mL via IV push.       PHYSICIAN INTERPRETATION:  Left Ventricle: The left ventricular systolic function is moderately decreased, with an estimated ejection fraction of 30-35%. There is global hypokinesis of the left ventricle with minor regional variations. The left ventricular cavity size is mild to moderately dilated. Spectral Doppler shows an impaired relaxation pattern of left ventricular diastolic filling.  Left Atrium: The left atrium is normal in size.  Right Ventricle: The right ventricle is normal in size. There is normal right ventricular global systolic function.  Right Atrium: The right atrium is mildly dilated.  Aortic Valve: The aortic valve appears structurally normal. There is no  evidence of aortic valve regurgitation. The peak instantaneous gradient of the aortic valve is 4.3 mmHg. The mean gradient of the aortic valve is 2.7 mmHg.  Mitral Valve: The mitral valve is normal in structure. There is mild mitral valve regurgitation.  Tricuspid Valve: The tricuspid valve is structurally normal. There is trace to mild tricuspid regurgitation. The Doppler estimated RVSP is within normal limits at 24.9 mmHg.  Pulmonic Valve: The pulmonic valve is structurally normal. There is no indication of pulmonic valve regurgitation.  Pericardium: There is no pericardial effusion noted.  Aorta: The aortic root is normal.  Systemic Veins: The inferior vena cava appears to be of normal size.  In comparison to the previous echocardiogram(s): Compared with study from 8/16/2022, LV function has improved (prior EF 20-25%).       CONCLUSIONS:   1. Left ventricular systolic function is moderately decreased with a 30-35% estimated ejection fraction.   2. Spectral Doppler shows an impaired relaxation pattern of left ventricular diastolic filling.   3. Mild mitral valve regurgitation.   4. RVSP within normal limits.   5. There is global hypokinesis of the left ventricle with minor regional variations.    QUANTITATIVE DATA SUMMARY:  2D MEASUREMENTS:                            Normal Ranges:  LAs:           5.15 cm    (2.7-4.0cm)  IVSd:          1.15 cm    (0.6-1.1cm)  LVPWd:         1.18 cm    (0.6-1.1cm)  LVIDd:         6.19 cm    (3.9-5.9cm)  LVIDs:         5.14 cm  LV Mass Index: 198.5 g/m2  LV % FS        17.0 %    LA VOLUME:                                Normal Ranges:  LA Vol A4C:        57.1 ml    (22+/-6mL/m2)  LA Vol A2C:        34.6 ml  LA Vol BP:         44.5 ml  LA Vol Index A4C:  25.9 ml/m2  LA Vol Index A2C:  15.7 ml/m2  LA Vol Index BP:   20.2 ml/m2  LA Area A4C:       19.4 cm2  LA Area A2C:       15.1 cm2  LA Major Axis A4C: 5.6 cm  LA Major Axis A2C: 5.6 cm  LA Volume Index:   20.1 ml/m2  LA Vol A4C:         55.0 ml  LA Vol A2C:        33.5 ml    RA VOLUME BY A/L METHOD:                        Normal Ranges:  RA Area A4C: 21.0 cm2    M-MODE MEASUREMENTS:                   Normal Ranges:  Ao Root: 3.60 cm (2.0-3.7cm)  LAs:     4.58 cm (2.7-4.0cm)    AORTA MEASUREMENTS:                       Normal Ranges:  Ao Sinus, d: 3.20 cm (2.1-3.5cm)  Ao STJ, d:   3.30 cm (1.7-3.4cm)  Asc Ao, d:   3.50 cm (2.1-3.4cm)    LV SYSTOLIC FUNCTION BY 2D PLANIMETRY (MOD):                      Normal Ranges:  EF-A4C View: 19.9 % (>=55%)  EF-A2C View: 28.5 %  EF-Biplane:  25.1 %    LV DIASTOLIC FUNCTION:                                Normal Ranges:  MV Peak E:        0.40 m/s    (0.7-1.2 m/s)  MV Peak A:        0.60 m/s    (0.42-0.7 m/s)  E/A Ratio:        0.67        (1.0-2.2)  MV lateral e'     0.05 m/s  MV medial e'      0.05 m/s  MV A Dur:         119.95 msec  PulmV Sys Magno:    32.09 cm/s  PulmV Logan Magno:   35.91 cm/s  PulmV S/D Magno:    0.89  PulmV A Revs Magno: 22.24 cm/s  PulmV A Revs Dur: 94.58 msec    MITRAL VALVE:                  Normal Ranges:  MV DT: 142 msec (150-240msec)    AORTIC VALVE:                                    Normal Ranges:  AoV Vmax:                1.03 m/s (<=1.7m/s)  AoV Peak P.3 mmHg (<20mmHg)  AoV Mean P.7 mmHg (1.7-11.5mmHg)  LVOT Max Magno:            0.60 m/s (<=1.1m/s)  AoV VTI:                 20.18 cm (18-25cm)  LVOT VTI:                10.16 cm  LVOT Diameter:           2.01 cm  (1.8-2.4cm)  AoV Area, VTI:           1.59 cm2 (2.5-5.5cm2)  AoV Area,Vmax:           1.85 cm2 (2.5-4.5cm2)  AoV Dimensionless Index: 0.50       RIGHT VENTRICLE:  RV Basal 4.40 cm  RV Mid   3.30 cm  RV Major 8.6 cm  TAPSE:   29.0 mm    TRICUSPID VALVE/RVSP:                              Normal Ranges:  Peak TR Velocity: 2.34 m/s  Est. RA Pressure: 3 mmHg  RV Syst Pressure: 24.9 mmHg (< 30mmHg)  IVC Diam:         1.40 cm    PULMONIC VALVE:                          Normal Ranges:  PV Accel Time: 97  msec  (>120ms)  PV Max Magno:    0.6 m/s  (0.6-0.9m/s)  PV Max P.4 mmHg    Pulmonary Veins:  PulmV A Revs Dur: 94.58 msec  PulmV A Revs Magno: 22.24 cm/s  PulmV Logan Magno:   35.91 cm/s  PulmV S/D Magno:    0.89  PulmV Sys Magno:    32.09 cm/s       [id number] Javon Marin DO  Electronically signed on 2023 at 1:19:43 PM       ** Final **        Assessment/Plan       This very pleasant 68-year-old male with a nonischemic dilated cardiomyopathy 30% EF seems well compensated on his current regimen.  Encouraged him to continue on his meds as prescribed.  No further imaging is warranted at this point I suggested he see us again in 6 months    Orders:  No orders of the defined types were placed in this encounter.     Followup Appts:  Future Appointments   Date Time Provider Department Center   4/15/2024  3:00 PM Tremaine Brito MD [id number] [facility]   2024  2:20 PM Montez Butt MD [id number] [facility]           ____________________________________________________________  Steve Smalls MD    Senior Attending Physician  [facility]  [facility]

## 2024-02-05 LAB
ATRIAL RATE: 83 BPM
P AXIS: 69 DEGREES
P OFFSET: 176 MS
P ONSET: 116 MS
PR INTERVAL: 158 MS
Q ONSET: 195 MS
QRS COUNT: 14 BEATS
QRS DURATION: 144 MS
QT INTERVAL: 414 MS
QTC CALCULATION(BAZETT): 486 MS
QTC FREDERICIA: 461 MS
R AXIS: -71 DEGREES
T AXIS: 90 DEGREES
T OFFSET: 402 MS
VENTRICULAR RATE: 83 BPM

## 2024-03-16 DIAGNOSIS — N18.32 STAGE 3B CHRONIC KIDNEY DISEASE (MULTI): ICD-10-CM

## 2024-03-16 DIAGNOSIS — I50.9 CHRONIC CONGESTIVE HEART FAILURE, UNSPECIFIED HEART FAILURE TYPE (MULTI): ICD-10-CM

## 2024-03-18 RX ORDER — TORSEMIDE 10 MG/1
5 TABLET ORAL DAILY PRN
Qty: 45 TABLET | Refills: 2 | Status: SHIPPED | OUTPATIENT
Start: 2024-03-18

## 2024-04-11 DIAGNOSIS — N18.4 CHRONIC KIDNEY DISEASE, STAGE 4 (SEVERE) (MULTI): Primary | ICD-10-CM

## 2024-04-15 ENCOUNTER — LAB (OUTPATIENT)
Dept: LAB | Facility: LAB | Age: 69
End: 2024-04-15
Payer: MEDICARE

## 2024-04-15 ENCOUNTER — OFFICE VISIT (OUTPATIENT)
Dept: NEPHROLOGY | Facility: CLINIC | Age: 69
End: 2024-04-15
Payer: MEDICARE

## 2024-04-15 VITALS
WEIGHT: 220 LBS | TEMPERATURE: 97.8 F | BODY MASS INDEX: 31.5 KG/M2 | HEART RATE: 91 BPM | HEIGHT: 70 IN | SYSTOLIC BLOOD PRESSURE: 100 MMHG | DIASTOLIC BLOOD PRESSURE: 66 MMHG

## 2024-04-15 DIAGNOSIS — N18.4 CHRONIC KIDNEY DISEASE, STAGE 4 (SEVERE) (MULTI): ICD-10-CM

## 2024-04-15 DIAGNOSIS — N18.4 CHRONIC KIDNEY DISEASE, STAGE 4 (SEVERE) (MULTI): Primary | ICD-10-CM

## 2024-04-15 LAB
CREAT UR-MCNC: 79.6 MG/DL (ref 20–370)
MICROALBUMIN UR-MCNC: 33.9 MG/L
MICROALBUMIN/CREAT UR: 42.6 UG/MG CREAT
PTH-INTACT SERPL-MCNC: 48.9 PG/ML (ref 18.5–88)

## 2024-04-15 PROCEDURE — 36415 COLL VENOUS BLD VENIPUNCTURE: CPT

## 2024-04-15 PROCEDURE — 82043 UR ALBUMIN QUANTITATIVE: CPT

## 2024-04-15 PROCEDURE — 83970 ASSAY OF PARATHORMONE: CPT

## 2024-04-15 PROCEDURE — 82570 ASSAY OF URINE CREATININE: CPT

## 2024-04-15 PROCEDURE — 80069 RENAL FUNCTION PANEL: CPT

## 2024-04-15 PROCEDURE — 99213 OFFICE O/P EST LOW 20 MIN: CPT | Performed by: INTERNAL MEDICINE

## 2024-04-15 PROCEDURE — 1159F MED LIST DOCD IN RCRD: CPT | Performed by: INTERNAL MEDICINE

## 2024-04-15 NOTE — PROGRESS NOTES
"For follow up, doing well.  No complaints  No hospitalizations/illness since last visit  Not checking BP at home  Denies orthostatic symptoms  Has been taking Torsemide once a week    RoS negative for all other systems except as noted above.        3/29/2023    10:22 AM 6/5/2023     8:12 AM 12/12/2023     2:38 PM 12/18/2023     2:23 PM 1/30/2024     2:23 PM 1/30/2024     2:25 PM 4/15/2024     3:13 PM   Vitals   Systolic 127  95 97 94 114 100   Diastolic 72  60 64 61 67 66   Heart Rate 99  82 89 90  91   Temp 36.8 °C (98.3 °F)  36.7 °C (98 °F) 36.8 °C (98.3 °F)   36.6 °C (97.8 °F)   Height (in) 1.778 m (5' 10\") 1.778 m (5' 10\") 1.753 m (5' 9\")  1.778 m (5' 10\")  1.778 m (5' 10\")   Weight (lb) 227 227.13 230 233 229  220   BMI 32.57 kg/m2 32.59 kg/m2 33.97 kg/m2 34.41 kg/m2 32.86 kg/m2  31.57 kg/m2   BSA (m2) 2.26 m2 2.26 m2 2.25 m2 2.27 m2 2.27 m2  2.22 m2   Visit Report Report  Report Report Report Report Report      No distress  HEENT:  moist, no pallor  No edema filiberto LE  Breath sounds filiberto equal, clear  S1 S2 regular, normal, no rub or murmur  Abdomen soft  AAO x3, non focal    68-year-old with history of nonischemic cardiomyopathy with HFrEF and CKD G3A1.     #CKD G3A1: Labs from 12/18/23 show creatinine 2.64 mg per DL, EGFR 25 ml/min, no labs since then, advised to repeat labs today. Knows to be cautious with NSAIDs. Continue Farxiga 10 mg/day, tolerating well     #Bone mineral metabolism: check today, Ct daily over-the-counter vitamin D 1000 units.        #Hypertension: Goal blood pressure 120/80 mmHg, below goal. Denies orthostatic symptoms. Continue Entresto 49-51 mg 1 tablet twice a day, metoprolol succinate ER 25 mg one tablet daily, spironolactone 25 mg daily Continue 2.5 g/day sodium restricted diet.     RTC: 3-4 mo with repeat labs today          "

## 2024-04-16 LAB
ALBUMIN SERPL BCP-MCNC: 4.8 G/DL (ref 3.4–5)
ANION GAP SERPL CALC-SCNC: 16 MMOL/L (ref 10–20)
BUN SERPL-MCNC: 34 MG/DL (ref 6–23)
CALCIUM SERPL-MCNC: 9.9 MG/DL (ref 8.6–10.6)
CHLORIDE SERPL-SCNC: 101 MMOL/L (ref 98–107)
CO2 SERPL-SCNC: 27 MMOL/L (ref 21–32)
CREAT SERPL-MCNC: 2.14 MG/DL (ref 0.5–1.3)
EGFRCR SERPLBLD CKD-EPI 2021: 33 ML/MIN/1.73M*2
GLUCOSE SERPL-MCNC: 92 MG/DL (ref 74–99)
PHOSPHATE SERPL-MCNC: 3.2 MG/DL (ref 2.5–4.9)
POTASSIUM SERPL-SCNC: 4.5 MMOL/L (ref 3.5–5.3)
SODIUM SERPL-SCNC: 139 MMOL/L (ref 136–145)

## 2024-06-05 DIAGNOSIS — N18.32 STAGE 3B CHRONIC KIDNEY DISEASE (MULTI): ICD-10-CM

## 2024-06-05 DIAGNOSIS — I50.9 CHRONIC CONGESTIVE HEART FAILURE, UNSPECIFIED HEART FAILURE TYPE (MULTI): ICD-10-CM

## 2024-06-05 RX ORDER — METOPROLOL SUCCINATE 25 MG/1
25 TABLET, EXTENDED RELEASE ORAL DAILY
Qty: 90 TABLET | Refills: 1 | Status: SHIPPED | OUTPATIENT
Start: 2024-06-05

## 2024-06-12 ENCOUNTER — APPOINTMENT (OUTPATIENT)
Dept: PRIMARY CARE | Facility: CLINIC | Age: 69
End: 2024-06-12
Payer: MEDICARE

## 2024-06-12 VITALS
TEMPERATURE: 97.4 F | WEIGHT: 219.8 LBS | SYSTOLIC BLOOD PRESSURE: 101 MMHG | DIASTOLIC BLOOD PRESSURE: 68 MMHG | HEART RATE: 73 BPM | BODY MASS INDEX: 31.54 KG/M2

## 2024-06-12 DIAGNOSIS — I50.22 ACC/AHA STAGE C CHRONIC SYSTOLIC HEART FAILURE (MULTI): Primary | ICD-10-CM

## 2024-06-12 DIAGNOSIS — Z12.5 SCREENING FOR PROSTATE CANCER: ICD-10-CM

## 2024-06-12 DIAGNOSIS — Z53.20 COLON CANCER SCREENING DECLINED: ICD-10-CM

## 2024-06-12 PROCEDURE — 1126F AMNT PAIN NOTED NONE PRSNT: CPT | Performed by: INTERNAL MEDICINE

## 2024-06-12 PROCEDURE — 1036F TOBACCO NON-USER: CPT | Performed by: INTERNAL MEDICINE

## 2024-06-12 PROCEDURE — 1159F MED LIST DOCD IN RCRD: CPT | Performed by: INTERNAL MEDICINE

## 2024-06-12 PROCEDURE — 1160F RVW MEDS BY RX/DR IN RCRD: CPT | Performed by: INTERNAL MEDICINE

## 2024-06-12 PROCEDURE — G2211 COMPLEX E/M VISIT ADD ON: HCPCS | Performed by: INTERNAL MEDICINE

## 2024-06-12 PROCEDURE — 99214 OFFICE O/P EST MOD 30 MIN: CPT | Performed by: INTERNAL MEDICINE

## 2024-06-12 ASSESSMENT — ENCOUNTER SYMPTOMS
SHORTNESS OF BREATH: 0
CHILLS: 0
PALPITATIONS: 0
COUGH: 0
POLYDIPSIA: 0
FEVER: 0

## 2024-06-12 ASSESSMENT — PAIN SCALES - GENERAL: PAINLEVEL: 0-NO PAIN

## 2024-06-12 NOTE — PROGRESS NOTES
Subjective   Patient ID: Sebastian Jean Baptiste is a 68 y.o. male who presents for Follow-up.    68-year-old male presents today for routine follow-up on multiple health issues including stable systolic congestive chronic heart failure.  His most recent ejection fraction 6 months ago was estimated to be 30 to 35%.  Ultimately determined to be nonischemic her previous cardiac catheterization that was nonobstructive.  His previous ejection fraction prior to follow-up was 20 to 25%.  His EF remains in the 35% are under range and he does not currently have an ICD pacemaker previous EKGs show nonspecific intraventricular conduction delay but no left bundle branch was identified at that time.  Patient has never been evaluated by electrophysiology for the possibility or need of a ICD pacemaker for the purposes of preventing sudden cardiac death.  I believe he should visit with him at least 1 time to have this conversation to see if he would be a candidate for this type of preventative tool in the setting of his chronic reduced ejection fraction heart failure EF 35% are under.  At this time he is asymptomatic with no shortness of breath orthopnea paroxysmal nocturnal dyspnea or chronic edema.  He exercises vigorously without limitations.  He does not have dizzy spells lightheadedness chest pain or shortness of breath.  He is due for prostate cancer screening which is ordered, it can be completed routinely with his next blood panel for his nephrologist.  Otherwise he feels well and there are no immediate concerns.         Review of Systems   Constitutional:  Negative for chills and fever.   Respiratory:  Negative for cough and shortness of breath.    Cardiovascular:  Negative for chest pain and palpitations.   Endocrine: Negative for polydipsia and polyuria.       Objective   /68 (BP Location: Left arm, Patient Position: Sitting, BP Cuff Size: Adult)   Pulse 73   Temp 36.3 °C (97.4 °F) (Oral)   Wt 99.7 kg (219 lb 12.8 oz)    BMI 31.54 kg/m²     Physical Exam  Constitutional:       Appearance: Normal appearance.   HENT:      Head: Normocephalic and atraumatic.   Eyes:      Extraocular Movements: Extraocular movements intact.      Pupils: Pupils are equal, round, and reactive to light.   Neck:      Thyroid: No thyroid mass or thyromegaly.      Vascular: No carotid bruit.   Cardiovascular:      Rate and Rhythm: Normal rate and regular rhythm.      Heart sounds: No murmur heard.     No friction rub. No gallop.   Pulmonary:      Effort: No respiratory distress.      Breath sounds: No wheezing, rhonchi or rales.   Musculoskeletal:      Cervical back: Neck supple.      Right lower leg: No edema.      Left lower leg: No edema.   Lymphadenopathy:      Cervical: No cervical adenopathy.   Neurological:      Mental Status: He is alert.         Assessment/Plan   Problem List Items Addressed This Visit             ICD-10-CM    ACC/AHA stage C chronic systolic heart failure (Multi) - Primary I50.22    Relevant Orders    Referral to Cardiology     Other Visit Diagnoses         Codes    Screening for prostate cancer     Z12.5    Relevant Orders    Prostate Specific Antigen, Screen    Colon cancer screening declined     Z53.20

## 2024-06-27 DIAGNOSIS — I50.9 CHRONIC CONGESTIVE HEART FAILURE, UNSPECIFIED HEART FAILURE TYPE (MULTI): ICD-10-CM

## 2024-06-27 DIAGNOSIS — N18.32 STAGE 3B CHRONIC KIDNEY DISEASE (MULTI): ICD-10-CM

## 2024-06-27 NOTE — TELEPHONE ENCOUNTER
Please forward to cardiologist listed as monitoring and refilling script in case med has been changed.

## 2024-07-01 RX ORDER — SACUBITRIL AND VALSARTAN 49; 51 MG/1; MG/1
1 TABLET, FILM COATED ORAL 2 TIMES DAILY
Qty: 180 TABLET | Refills: 2 | Status: SHIPPED | OUTPATIENT
Start: 2024-07-01

## 2024-07-30 ENCOUNTER — APPOINTMENT (OUTPATIENT)
Dept: CARDIOLOGY | Facility: CLINIC | Age: 69
End: 2024-07-30
Payer: MEDICARE

## 2024-08-07 ENCOUNTER — OFFICE VISIT (OUTPATIENT)
Dept: CARDIOLOGY | Facility: CLINIC | Age: 69
End: 2024-08-07
Payer: MEDICARE

## 2024-08-07 VITALS
BODY MASS INDEX: 31 KG/M2 | HEIGHT: 70 IN | SYSTOLIC BLOOD PRESSURE: 84 MMHG | DIASTOLIC BLOOD PRESSURE: 57 MMHG | WEIGHT: 216.56 LBS | OXYGEN SATURATION: 95 % | HEART RATE: 79 BPM

## 2024-08-07 DIAGNOSIS — N18.32 STAGE 3B CHRONIC KIDNEY DISEASE (MULTI): ICD-10-CM

## 2024-08-07 DIAGNOSIS — I42.8 NON-ISCHEMIC CARDIOMYOPATHY (MULTI): ICD-10-CM

## 2024-08-07 DIAGNOSIS — I50.22 ACC/AHA STAGE C CHRONIC SYSTOLIC HEART FAILURE (MULTI): ICD-10-CM

## 2024-08-07 DIAGNOSIS — I50.9 CHRONIC CONGESTIVE HEART FAILURE, UNSPECIFIED HEART FAILURE TYPE (MULTI): ICD-10-CM

## 2024-08-07 PROCEDURE — 1036F TOBACCO NON-USER: CPT | Performed by: INTERNAL MEDICINE

## 2024-08-07 PROCEDURE — 3008F BODY MASS INDEX DOCD: CPT | Performed by: INTERNAL MEDICINE

## 2024-08-07 PROCEDURE — 1160F RVW MEDS BY RX/DR IN RCRD: CPT | Performed by: INTERNAL MEDICINE

## 2024-08-07 PROCEDURE — 99214 OFFICE O/P EST MOD 30 MIN: CPT | Performed by: INTERNAL MEDICINE

## 2024-08-07 PROCEDURE — 1159F MED LIST DOCD IN RCRD: CPT | Performed by: INTERNAL MEDICINE

## 2024-08-07 ASSESSMENT — COLUMBIA-SUICIDE SEVERITY RATING SCALE - C-SSRS
6. HAVE YOU EVER DONE ANYTHING, STARTED TO DO ANYTHING, OR PREPARED TO DO ANYTHING TO END YOUR LIFE?: NO
1. IN THE PAST MONTH, HAVE YOU WISHED YOU WERE DEAD OR WISHED YOU COULD GO TO SLEEP AND NOT WAKE UP?: NO
2. HAVE YOU ACTUALLY HAD ANY THOUGHTS OF KILLING YOURSELF?: NO

## 2024-08-07 ASSESSMENT — ENCOUNTER SYMPTOMS
OCCASIONAL FEELINGS OF UNSTEADINESS: 0
LOSS OF SENSATION IN FEET: 0
DEPRESSION: 0

## 2024-08-07 NOTE — PROGRESS NOTES
Primary Care Physician: Montez Butt MD  Date of Visit: 08/07/2024  2:00 PM EDT  Location of visit: [facility] [address]     Chief Complaint:   No chief complaint on file.       HPI / Summary:   Sebastian Jean Baptiste is a 68 y.o. male presents for followup.     Cardiomyopathy, December 2023 echo 30% globally reduced EF severe LV enlargement.  Mild MRCody  Denies any significant limitation in physical exercise nor is he experiencing any signs of volume overload      Specialty Problems          Cardiology Problems    ACC/AHA stage C chronic systolic heart failure (Multi)    CHF (congestive heart failure) (Multi)    Coronary artery disease involving native coronary artery of native heart without angina pectoris    Non-ischemic cardiomyopathy (Multi)        Past Medical History:   Diagnosis Date    Other specified health status     No pertinent past medical history          Past Surgical History:   Procedure Laterality Date    OTHER SURGICAL HISTORY  10/26/2022    No history of surgery          Social History:   reports that he has never smoked. He has never been exposed to tobacco smoke. He has never used smokeless tobacco. He reports that he does not drink alcohol and does not use drugs.      Allergies:  No Known Allergies    Outpatient Medications:  Current Outpatient Medications   Medication Instructions    aspirin 81 mg chewable tablet USE AS DIRECTED.    atorvastatin (LIPITOR) 40 mg, oral, Nightly    dapagliflozin propanediol (FARXIGA) 10 mg, oral, Daily    ergocalciferol, vitamin D2, (VITAMIN D2 ORAL) 2 %, oral, Daily    metoprolol succinate XL (TOPROL-XL) 25 mg, oral, Daily, Swallow whole. Do not chew or crush    sacubitriL-valsartan (Entresto) 49-51 mg tablet 1 tablet, oral, 2 times daily    spironolactone (ALDACTONE) 12.5 mg, oral, Daily    torsemide (DEMADEX) 5 mg, oral, Daily PRN       ROS     Physical Exam:  Vitals:    08/07/24 1419   BP: 84/57   BP Location: Left arm   Patient Position: Sitting   Pulse: 79   SpO2:  "95%   Weight: 98.2 kg (216 lb 9 oz)   Height: 1.778 m (5' 10\")     Wt Readings from Last 5 Encounters:   08/07/24 98.2 kg (216 lb 9 oz)   06/12/24 99.7 kg (219 lb 12.8 oz)   04/15/24 99.8 kg (220 lb)   01/30/24 104 kg (229 lb)   12/18/23 106 kg (233 lb)     Body mass index is 31.07 kg/m².     Well-developed male in no acute distress.  Flat JVP.  Normal carotid upstrokes.  Regular rhythm without audible murmur  Last Labs:  CMP:  Recent Labs     04/15/24  1541 12/18/23  1510 09/25/23  1152 06/02/23  1820 02/25/23  1140    140 140 139 135*   K 4.5 4.1 4.4 4.5 4.7    102 103 105 101   CO2 27 26 25 23 24   ANIONGAP 16 16 16 16 15   BUN 34* 39* 28* 43* 39*   CREATININE 2.14* 2.64* 2.14* 2.91* 2.44*   EGFR 33* 25*  --   --   --    GLUCOSE 92 115* 93 105* 137*     Recent Labs     04/15/24  1541 12/18/23  1510 09/25/23  1152 06/02/23  1820 02/25/23  1140 11/20/22  1948 09/10/22  1100 08/25/22  1500 08/15/22  2159 12/26/21  1130 07/08/19  0938 06/16/19 2000   ALBUMIN 4.8 4.8 4.8 4.4 4.8 4.5 4.6 4.5 3.9 4.0   < > 4.1   ALKPHOS  --   --   --   --   --  58 66 80 82 46   < > 66   ALT  --   --   --   --   --  19 21 39 54* 22   < > 16   AST  --   --   --   --   --  20 17 32 37 22   < > 15   BILITOT  --   --   --   --   --  0.5 0.5 0.6 0.9 0.8   < > 0.6   LIPASE  --   --   --   --   --   --   --   --   --  146*  --  74    < > = values in this interval not displayed.     CBC:  Recent Labs     02/25/23  1140 11/20/22  1948 08/25/22  1500 08/18/22  0511 08/17/22  0618   WBC 6.1 6.7 6.4 6.9 5.7   HGB 16.3 16.1 17.2 15.5 13.6   HCT 50.9 49.1 52.5* 46.0 41.6    222 273 244 236   MCV 87 83 85 82 83     COAG: No results for input(s): \"INR\", \"DDIMERVTE\" in the last 62282 hours.  HEME/ENDO:  Recent Labs     08/18/22  0511   FERRITIN 161   IRONSAT 17*      CARDIAC:   Recent Labs     11/20/22  2114 11/20/22  1948 10/19/22  1130 08/16/22  0023 08/15/22  2159   TROPHS 20 21*  --  31* 34*   BNP  --  47 94  --  552*   No results " "for input(s): \"CHOL\", \"LDLF\", \"HDL\", \"TRIG\" in the last 23660 hours.    Last Cardiology Tests:  ECG:      Echo:  Echo Results:  Transthoracic Echo (TTE) Complete With Contrast 12/28/2023    [facility], [address] [place] [address]  Tel [phone] and Fax [phone]    TRANSTHORACIC ECHOCARDIOGRAM REPORT      Patient Name:      MARISELA Gruber Physician:    [id number] Javon Marin DO  Study Date:        12/28/2023          Ordering Provider:    [id number] DARRYN LLOYD  MRN/PID:           [id number]            Fellow:  Accession#:        [id number]        Nurse:                Sundeep Hart RN  Date of Birth/Age: 1955 / 68     Sonographer:          Che Mcnair RDCS  years  Gender:            M                   Additional Staff:  Height:            175.00 cm           Admit Date:           12/28/2023  Weight:            106.00 kg           Admission Status:     Outpatient  BSA:               2.20 m2             Encounter#:           [id number]  Department Location:  [facility]  Blood Pressure: 97 /64 mmHg    Study Type:    TRANSTHORACIC ECHO (TTE) COMPLETE  Diagnosis/ICD: Encounter for other specified special examinations-Z01.89;  Chronic systolic (congestive) heart failure (CHF)-I50.22  Indication:    reduced EF, CHF, EF20% lost to follow up, chronic systolic heart  failure  CPT Code:      Echo Complete w Full Doppler-12318    Patient History:  Pertinent History: CHF, CAD and Cardiomyopathy.    Study Detail: The following Echo studies were performed: 2D, M-Mode, Doppler and  color flow. Technically challenging study due to poor acoustic  windows and body habitus. Definity used as a contrast agent for  endocardial border definition. Total contrast used for this  procedure was 2 mL via IV push.      PHYSICIAN INTERPRETATION:  Left Ventricle: The left ventricular systolic function is moderately decreased, with an estimated ejection fraction of " 30-35%. There is global hypokinesis of the left ventricle with minor regional variations. The left ventricular cavity size is mild to moderately dilated. Spectral Doppler shows an impaired relaxation pattern of left ventricular diastolic filling.  Left Atrium: The left atrium is normal in size.  Right Ventricle: The right ventricle is normal in size. There is normal right ventricular global systolic function.  Right Atrium: The right atrium is mildly dilated.  Aortic Valve: The aortic valve appears structurally normal. There is no evidence of aortic valve regurgitation. The peak instantaneous gradient of the aortic valve is 4.3 mmHg. The mean gradient of the aortic valve is 2.7 mmHg.  Mitral Valve: The mitral valve is normal in structure. There is mild mitral valve regurgitation.  Tricuspid Valve: The tricuspid valve is structurally normal. There is trace to mild tricuspid regurgitation. The Doppler estimated RVSP is within normal limits at 24.9 mmHg.  Pulmonic Valve: The pulmonic valve is structurally normal. There is no indication of pulmonic valve regurgitation.  Pericardium: There is no pericardial effusion noted.  Aorta: The aortic root is normal.  Systemic Veins: The inferior vena cava appears to be of normal size.  In comparison to the previous echocardiogram(s): Compared with study from 8/16/2022, LV function has improved (prior EF 20-25%).      CONCLUSIONS:  1. Left ventricular systolic function is moderately decreased with a 30-35% estimated ejection fraction.  2. Spectral Doppler shows an impaired relaxation pattern of left ventricular diastolic filling.  3. Mild mitral valve regurgitation.  4. RVSP within normal limits.  5. There is global hypokinesis of the left ventricle with minor regional variations.    QUANTITATIVE DATA SUMMARY:  2D MEASUREMENTS:  Normal Ranges:  LAs:           5.15 cm    (2.7-4.0cm)  IVSd:          1.15 cm    (0.6-1.1cm)  LVPWd:         1.18 cm    (0.6-1.1cm)  LVIDd:         6.19  cm    (3.9-5.9cm)  LVIDs:         5.14 cm  LV Mass Index: 198.5 g/m2  LV % FS        17.0 %    LA VOLUME:  Normal Ranges:  LA Vol A4C:        57.1 ml    (22+/-6mL/m2)  LA Vol A2C:        34.6 ml  LA Vol BP:         44.5 ml  LA Vol Index A4C:  25.9 ml/m2  LA Vol Index A2C:  15.7 ml/m2  LA Vol Index BP:   20.2 ml/m2  LA Area A4C:       19.4 cm2  LA Area A2C:       15.1 cm2  LA Major Axis A4C: 5.6 cm  LA Major Axis A2C: 5.6 cm  LA Volume Index:   20.1 ml/m2  LA Vol A4C:        55.0 ml  LA Vol A2C:        33.5 ml    RA VOLUME BY A/L METHOD:  Normal Ranges:  RA Area A4C: 21.0 cm2    M-MODE MEASUREMENTS:  Normal Ranges:  Ao Root: 3.60 cm (2.0-3.7cm)  LAs:     4.58 cm (2.7-4.0cm)    AORTA MEASUREMENTS:  Normal Ranges:  Ao Sinus, d: 3.20 cm (2.1-3.5cm)  Ao STJ, d:   3.30 cm (1.7-3.4cm)  Asc Ao, d:   3.50 cm (2.1-3.4cm)    LV SYSTOLIC FUNCTION BY 2D PLANIMETRY (MOD):  Normal Ranges:  EF-A4C View: 19.9 % (>=55%)  EF-A2C View: 28.5 %  EF-Biplane:  25.1 %    LV DIASTOLIC FUNCTION:  Normal Ranges:  MV Peak E:        0.40 m/s    (0.7-1.2 m/s)  MV Peak A:        0.60 m/s    (0.42-0.7 m/s)  E/A Ratio:        0.67        (1.0-2.2)  MV lateral e'     0.05 m/s  MV medial e'      0.05 m/s  MV A Dur:         119.95 msec  PulmV Sys Magno:    32.09 cm/s  PulmV Logan Magno:   35.91 cm/s  PulmV S/D Magno:    0.89  PulmV A Revs Magno: 22.24 cm/s  PulmV A Revs Dur: 94.58 msec    MITRAL VALVE:  Normal Ranges:  MV DT: 142 msec (150-240msec)    AORTIC VALVE:  Normal Ranges:  AoV Vmax:                1.03 m/s (<=1.7m/s)  AoV Peak P.3 mmHg (<20mmHg)  AoV Mean P.7 mmHg (1.7-11.5mmHg)  LVOT Max Magno:            0.60 m/s (<=1.1m/s)  AoV VTI:                 20.18 cm (18-25cm)  LVOT VTI:                10.16 cm  LVOT Diameter:           2.01 cm  (1.8-2.4cm)  AoV Area, VTI:           1.59 cm2 (2.5-5.5cm2)  AoV Area,Vmax:           1.85 cm2 (2.5-4.5cm2)  AoV Dimensionless Index: 0.50      RIGHT VENTRICLE:  RV Basal 4.40 cm  RV Mid    3.30 cm  RV Major 8.6 cm  TAPSE:   29.0 mm    TRICUSPID VALVE/RVSP:  Normal Ranges:  Peak TR Velocity: 2.34 m/s  Est. RA Pressure: 3 mmHg  RV Syst Pressure: 24.9 mmHg (< 30mmHg)  IVC Diam:         1.40 cm    PULMONIC VALVE:  Normal Ranges:  PV Accel Time: 97 msec  (>120ms)  PV Max Magno:    0.6 m/s  (0.6-0.9m/s)  PV Max P.4 mmHg    Pulmonary Veins:  PulmV A Revs Dur: 94.58 msec  PulmV A Revs Magno: 22.24 cm/s  PulmV Logan Magno:   35.91 cm/s  PulmV S/D Magno:    0.89  PulmV Sys Magno:    32.09 cm/s      [id number] Javon Marin DO  Electronically signed on 2023 at 1:19:43 PM        ** Final **       Cath:      Stress Test:  Stress Results:  No results found for this or any previous visit from the past 365 days.         Cardiac Imaging:  ECG 12 lead (Clinic Performed)  Normal sinus rhythm  Left axis deviation  Right bundle branch block  Left ventricular hypertrophy with QRS widening and repolarization abnormality  Anteroseptal infarct (cited on or before 2024)  Abnormal ECG  When compared with ECG of 2022 19:14,  Previous ECG has undetermined rhythm, needs review  Right bundle branch block is now Present  Questionable change in initial forces of Septal leads  Confirmed by Steve Smalls ([id number]) on 2024 12:41:31 PM        Assessment/Plan     He has reduced EF but no clinical heart failure, limited heart rate and blood pressure reserve continue current doses of GDMT.  Continues to follow with renal for monitoring of his stage IV CKD.  No change in regimen advised with office follow-up scheduled in 6 months recently had an EF assessment in 2023 likely repeat towards the end of     Orders:  No orders of the defined types were placed in this encounter.     Followup Appts:  Future Appointments   Date Time Provider Department Center   2024  3:20 PM Tremaine Brito MD [id number] [place]           ____________________________________________________________  MD Conor Loomis  Attending Physician  [facility]  [facility]

## 2024-08-28 ENCOUNTER — LAB (OUTPATIENT)
Dept: LAB | Facility: LAB | Age: 69
End: 2024-08-28
Payer: MEDICARE

## 2024-08-29 LAB — COTININE UR QL SCN: NEGATIVE

## 2024-09-05 DIAGNOSIS — N18.4 CHRONIC KIDNEY DISEASE, STAGE 4 (SEVERE) (MULTI): Primary | ICD-10-CM

## 2024-09-09 ENCOUNTER — LAB (OUTPATIENT)
Dept: LAB | Facility: LAB | Age: 69
End: 2024-09-09
Payer: MEDICARE

## 2024-09-09 ENCOUNTER — APPOINTMENT (OUTPATIENT)
Dept: NEPHROLOGY | Facility: CLINIC | Age: 69
End: 2024-09-09
Payer: MEDICARE

## 2024-09-09 VITALS
TEMPERATURE: 97.8 F | HEART RATE: 78 BPM | BODY MASS INDEX: 31.91 KG/M2 | OXYGEN SATURATION: 95 % | WEIGHT: 222.4 LBS | DIASTOLIC BLOOD PRESSURE: 66 MMHG | SYSTOLIC BLOOD PRESSURE: 108 MMHG

## 2024-09-09 DIAGNOSIS — N18.32 STAGE 3B CHRONIC KIDNEY DISEASE (MULTI): ICD-10-CM

## 2024-09-09 DIAGNOSIS — N18.4 CHRONIC KIDNEY DISEASE, STAGE 4 (SEVERE) (MULTI): ICD-10-CM

## 2024-09-09 DIAGNOSIS — I50.9 CHRONIC CONGESTIVE HEART FAILURE, UNSPECIFIED HEART FAILURE TYPE (MULTI): ICD-10-CM

## 2024-09-09 LAB
CREAT UR-MCNC: 85.5 MG/DL (ref 20–370)
MICROALBUMIN UR-MCNC: 7.1 MG/L
MICROALBUMIN/CREAT UR: 8.3 UG/MG CREAT
PTH-INTACT SERPL-MCNC: 88.9 PG/ML (ref 18.5–88)

## 2024-09-09 PROCEDURE — 83970 ASSAY OF PARATHORMONE: CPT

## 2024-09-09 PROCEDURE — 99212 OFFICE O/P EST SF 10 MIN: CPT | Performed by: INTERNAL MEDICINE

## 2024-09-09 PROCEDURE — 82043 UR ALBUMIN QUANTITATIVE: CPT

## 2024-09-09 PROCEDURE — 82570 ASSAY OF URINE CREATININE: CPT

## 2024-09-09 PROCEDURE — 80069 RENAL FUNCTION PANEL: CPT

## 2024-09-09 PROCEDURE — 1159F MED LIST DOCD IN RCRD: CPT | Performed by: INTERNAL MEDICINE

## 2024-09-09 PROCEDURE — 36415 COLL VENOUS BLD VENIPUNCTURE: CPT

## 2024-09-09 PROCEDURE — 1126F AMNT PAIN NOTED NONE PRSNT: CPT | Performed by: INTERNAL MEDICINE

## 2024-09-09 PROCEDURE — 82306 VITAMIN D 25 HYDROXY: CPT

## 2024-09-09 RX ORDER — SPIRONOLACTONE 25 MG/1
25 TABLET ORAL DAILY
Qty: 90 TABLET | Refills: 3 | Status: SHIPPED | OUTPATIENT
Start: 2024-09-09 | End: 2025-09-09

## 2024-09-09 ASSESSMENT — PAIN SCALES - GENERAL: PAINLEVEL: 0-NO PAIN

## 2024-09-09 NOTE — PROGRESS NOTES
"For follow up, doing well.  No complaints  No hospitalizations/illness since last visit  Not checking BP at home  Denies orthostatic symptoms, denies Scott/orthopnea    RoS negative for all other systems except as noted above.        12/18/2023     2:23 PM 1/30/2024     2:23 PM 1/30/2024     2:25 PM 4/15/2024     3:13 PM 6/12/2024     2:29 PM 8/7/2024     2:19 PM 9/9/2024     3:37 PM   Vitals   Systolic 97 94 114 100 101 84 108   Diastolic 64 61 67 66 68 57 66   Heart Rate 89 90  91 73 79 78   Temp 36.8 °C (98.3 °F)   36.6 °C (97.8 °F) 36.3 °C (97.4 °F)  36.6 °C (97.8 °F)   Height (in)  1.778 m (5' 10\")  1.778 m (5' 10\")  1.778 m (5' 10\")    Weight (lb) 233 229  220 219.8 216.56 222.4   BMI 34.41 kg/m2 32.86 kg/m2  31.57 kg/m2 31.54 kg/m2 31.07 kg/m2 31.91 kg/m2   BSA (m2) 2.27 m2 2.27 m2  2.22 m2 2.22 m2 2.2 m2 2.23 m2   Visit Report Report Report Report Report Report Report Report     No distress  HEENT:  moist, no pallor  No edema filiberto LE  Breath sounds filiberto equal, clear  S1 S2 regular, normal, no rub or murmur  Abdomen soft  AAO x3, non focal    Lab Results   Component Value Date     04/15/2024     12/18/2023     09/25/2023    K 4.5 04/15/2024    K 4.1 12/18/2023    K 4.4 09/25/2023     04/15/2024     12/18/2023     09/25/2023    CO2 27 04/15/2024    CO2 26 12/18/2023    CO2 25 09/25/2023    BUN 34 (H) 04/15/2024    BUN 39 (H) 12/18/2023    BUN 28 (H) 09/25/2023    CREATININE 2.14 (H) 04/15/2024    CREATININE 2.64 (H) 12/18/2023    CREATININE 2.14 (H) 09/25/2023    CALCIUM 9.9 04/15/2024    CALCIUM 9.5 12/18/2023    CALCIUM 10.2 09/25/2023    PHOS 3.2 04/15/2024    PHOS 4.3 12/18/2023    PHOS 3.7 09/25/2023    VITD25 27 (L) 12/18/2023    VITD25 28 (A) 09/25/2023    VITD25 59 06/02/2023    MICROALBCREA 42.6 (H) 04/15/2024    MICROALBCREA  12/18/2023      Comment:      One or more analytes used in this calculation is outside of the analytical measurement range. Calculation cannot be " performed.    MICROALBCREA 21.1 09/25/2023    HGB 16.3 02/25/2023    HGB 16.1 11/20/2022    HGB 17.2 08/25/2022      Current Outpatient Medications   Medication Instructions    aspirin 81 mg chewable tablet USE AS DIRECTED.    atorvastatin (LIPITOR) 40 mg, oral, Nightly    dapagliflozin propanediol (FARXIGA) 10 mg, oral, Daily    ergocalciferol, vitamin D2, (VITAMIN D2 ORAL) 2 %, oral, Daily    metoprolol succinate XL (TOPROL-XL) 25 mg, oral, Daily, Swallow whole. Do not chew or crush    sacubitriL-valsartan (Entresto) 49-51 mg tablet 1 tablet, oral, 2 times daily    spironolactone (ALDACTONE) 12.5 mg, oral, Daily    torsemide (DEMADEX) 5 mg, oral, Daily PRN         68-year-old with history of nonischemic cardiomyopathy with HFrEF and CKD G3A1.     #CKD G3A1: Labs from 4/15/24 show creatinine 2.14 mg per DL, EGFR 33 ml/min, no labs since then, advised to repeat labs today. Knows to be cautious with NSAIDs. Continue Farxiga 10 mg/day, tolerating well     #Bone mineral metabolism: check today, Ct daily over-the-counter vitamin D 1000 units.        #Hypertension: Goal blood pressure 120/80 mmHg, below goal. Denies orthostatic symptoms. Continue Entresto 49-51 mg 1 tablet twice a day, metoprolol succinate ER 25 mg one tablet daily, spironolactone 25 mg daily- also takes Torsemide 10 mg daily.Continue 2.5 g/day sodium restricted diet.     RTC: 3-4 mo with repeat labs today

## 2024-09-10 LAB
25(OH)D3 SERPL-MCNC: 62 NG/ML (ref 30–100)
ALBUMIN SERPL BCP-MCNC: 4.5 G/DL (ref 3.4–5)
ANION GAP SERPL CALC-SCNC: 12 MMOL/L (ref 10–20)
BUN SERPL-MCNC: 32 MG/DL (ref 6–23)
CALCIUM SERPL-MCNC: 9.5 MG/DL (ref 8.6–10.6)
CHLORIDE SERPL-SCNC: 101 MMOL/L (ref 98–107)
CO2 SERPL-SCNC: 30 MMOL/L (ref 21–32)
CREAT SERPL-MCNC: 2.17 MG/DL (ref 0.5–1.3)
EGFRCR SERPLBLD CKD-EPI 2021: 32 ML/MIN/1.73M*2
GLUCOSE SERPL-MCNC: 83 MG/DL (ref 74–99)
PHOSPHATE SERPL-MCNC: 4.5 MG/DL (ref 2.5–4.9)
POTASSIUM SERPL-SCNC: 4.6 MMOL/L (ref 3.5–5.3)
SODIUM SERPL-SCNC: 138 MMOL/L (ref 136–145)

## 2024-11-28 DIAGNOSIS — I50.9 CHRONIC CONGESTIVE HEART FAILURE, UNSPECIFIED HEART FAILURE TYPE: ICD-10-CM

## 2024-11-28 DIAGNOSIS — N18.32 STAGE 3B CHRONIC KIDNEY DISEASE (MULTI): ICD-10-CM

## 2024-12-03 RX ORDER — METOPROLOL SUCCINATE 25 MG/1
25 TABLET, EXTENDED RELEASE ORAL DAILY
Qty: 90 TABLET | Refills: 1 | Status: SHIPPED | OUTPATIENT
Start: 2024-12-03

## 2025-01-30 ENCOUNTER — APPOINTMENT (OUTPATIENT)
Dept: PRIMARY CARE | Facility: CLINIC | Age: 70
End: 2025-01-30
Payer: MEDICARE

## 2025-01-30 ENCOUNTER — HOSPITAL ENCOUNTER (OUTPATIENT)
Dept: RADIOLOGY | Facility: CLINIC | Age: 70
Discharge: HOME | End: 2025-01-30
Payer: MEDICARE

## 2025-01-30 VITALS
SYSTOLIC BLOOD PRESSURE: 95 MMHG | DIASTOLIC BLOOD PRESSURE: 58 MMHG | HEART RATE: 81 BPM | HEIGHT: 69 IN | WEIGHT: 212.2 LBS | TEMPERATURE: 97.6 F | BODY MASS INDEX: 31.43 KG/M2

## 2025-01-30 DIAGNOSIS — N18.4 CHRONIC KIDNEY DISEASE, STAGE 4 (SEVERE) (MULTI): ICD-10-CM

## 2025-01-30 DIAGNOSIS — R09.89 ABNORMAL LUNG SOUNDS: ICD-10-CM

## 2025-01-30 DIAGNOSIS — I50.9 CHRONIC CONGESTIVE HEART FAILURE, UNSPECIFIED HEART FAILURE TYPE: ICD-10-CM

## 2025-01-30 DIAGNOSIS — R63.4 ABNORMAL WEIGHT LOSS: ICD-10-CM

## 2025-01-30 DIAGNOSIS — Z00.00 ROUTINE GENERAL MEDICAL EXAMINATION AT HEALTH CARE FACILITY: Primary | ICD-10-CM

## 2025-01-30 DIAGNOSIS — Z12.11 ENCOUNTER FOR SCREENING FOR MALIGNANT NEOPLASM OF COLON: ICD-10-CM

## 2025-01-30 DIAGNOSIS — N18.32 STAGE 3B CHRONIC KIDNEY DISEASE (MULTI): ICD-10-CM

## 2025-01-30 DIAGNOSIS — K76.0 FATTY LIVER: ICD-10-CM

## 2025-01-30 DIAGNOSIS — G56.03 CARPAL TUNNEL SYNDROME, BILATERAL: ICD-10-CM

## 2025-01-30 DIAGNOSIS — Z12.5 SCREENING FOR PROSTATE CANCER: ICD-10-CM

## 2025-01-30 PROCEDURE — 1159F MED LIST DOCD IN RCRD: CPT | Performed by: INTERNAL MEDICINE

## 2025-01-30 PROCEDURE — 1036F TOBACCO NON-USER: CPT | Performed by: INTERNAL MEDICINE

## 2025-01-30 PROCEDURE — 1170F FXNL STATUS ASSESSED: CPT | Performed by: INTERNAL MEDICINE

## 2025-01-30 PROCEDURE — 1160F RVW MEDS BY RX/DR IN RCRD: CPT | Performed by: INTERNAL MEDICINE

## 2025-01-30 PROCEDURE — 3008F BODY MASS INDEX DOCD: CPT | Performed by: INTERNAL MEDICINE

## 2025-01-30 PROCEDURE — 99397 PER PM REEVAL EST PAT 65+ YR: CPT | Performed by: INTERNAL MEDICINE

## 2025-01-30 PROCEDURE — 71046 X-RAY EXAM CHEST 2 VIEWS: CPT

## 2025-01-30 PROCEDURE — G0439 PPPS, SUBSEQ VISIT: HCPCS | Performed by: INTERNAL MEDICINE

## 2025-01-30 RX ORDER — ATORVASTATIN CALCIUM 40 MG/1
40 TABLET, FILM COATED ORAL NIGHTLY
Qty: 90 TABLET | Refills: 3 | Status: SHIPPED | OUTPATIENT
Start: 2025-01-30 | End: 2026-01-30

## 2025-01-30 RX ORDER — DAPAGLIFLOZIN 10 MG/1
10 TABLET, FILM COATED ORAL DAILY
Qty: 90 TABLET | Refills: 2 | Status: SHIPPED | OUTPATIENT
Start: 2025-01-30

## 2025-01-30 RX ORDER — TORSEMIDE 10 MG/1
10 TABLET ORAL DAILY
Qty: 90 TABLET | Refills: 3 | Status: SHIPPED | OUTPATIENT
Start: 2025-01-30 | End: 2026-01-30

## 2025-01-30 ASSESSMENT — PATIENT HEALTH QUESTIONNAIRE - PHQ9
1. LITTLE INTEREST OR PLEASURE IN DOING THINGS: NOT AT ALL
SUM OF ALL RESPONSES TO PHQ9 QUESTIONS 1 AND 2: 0
2. FEELING DOWN, DEPRESSED OR HOPELESS: NOT AT ALL

## 2025-01-30 ASSESSMENT — ENCOUNTER SYMPTOMS
LOSS OF SENSATION IN FEET: 0
COUGH: 0
OCCASIONAL FEELINGS OF UNSTEADINESS: 0
FEVER: 0
DEPRESSION: 0
SHORTNESS OF BREATH: 0
PALPITATIONS: 0
CHILLS: 0
POLYDIPSIA: 0

## 2025-01-30 ASSESSMENT — ACTIVITIES OF DAILY LIVING (ADL)
TAKING_MEDICATION: INDEPENDENT
DOING_HOUSEWORK: INDEPENDENT
MANAGING_FINANCES: INDEPENDENT
GROCERY_SHOPPING: INDEPENDENT
DRESSING: INDEPENDENT
BATHING: INDEPENDENT

## 2025-01-30 NOTE — LETTER
January 31, 2025     Sebastian Jean Baptiste  [address] [address] [facility] [address]      Dear Mr. Jean Baptiste:    Below are the results from your recent visit:    Resulted Orders   CBC and Auto Differential   Result Value Ref Range    WHITE BLOOD CELL COUNT 4.9 3.8 - 10.8 Thousand/uL    RED BLOOD CELL COUNT 5.53 4.20 - 5.80 Million/uL    HEMOGLOBIN 15.0 13.2 - 17.1 g/dL    HEMATOCRIT 47.0 38.5 - 50.0 %    MCV 85.0 80.0 - 100.0 fL    MCH 27.1 27.0 - 33.0 pg    MCHC 31.9 (L) 32.0 - 36.0 g/dL      Comment:      For adults, a slight decrease in the calculated MCHC  value (in the range of 30 to 32 g/dL) is most likely  not clinically significant; however, it should be  interpreted with caution in correlation with other  red cell parameters and the patient's clinical  condition.      RDW 13.7 11.0 - 15.0 %    PLATELET COUNT 217 140 - 400 Thousand/uL    MPV 11.6 7.5 - 12.5 fL    ABSOLUTE NEUTROPHILS 2,690 1,500 - 7,800 cells/uL    ABSOLUTE LYMPHOCYTES 1,573 850 - 3,900 cells/uL    ABSOLUTE MONOCYTES 500 200 - 950 cells/uL    ABSOLUTE EOSINOPHILS 108 15 - 500 cells/uL    ABSOLUTE BASOPHILS 29 0 - 200 cells/uL    NEUTROPHILS 54.9 %    LYMPHOCYTES 32.1 %    MONOCYTES 10.2 %    EOSINOPHILS 2.2 %    BASOPHILS 0.6 %    Narrative    FASTING:NO    FASTING: NO   TSH with reflex to Free T4 if abnormal   Result Value Ref Range    TSH W/REFLEX TO FT4 1.39 0.40 - 4.50 mIU/L    Narrative    FASTING:NO    FASTING: NO   Renal function panel   Result Value Ref Range    GLUCOSE 79 65 - 139 mg/dL      Comment:                Non-fasting reference interval         UREA NITROGEN (BUN) 17 7 - 25 mg/dL    CREATININE 1.77 (H) 0.70 - 1.28 mg/dL    EGFR 41 (L) > OR = 60 mL/min/1.73m2    BUN/CREATININE RATIO 10 6 - 22 (calc)    SODIUM 143 135 - 146 mmol/L    POTASSIUM 4.4 3.5 - 5.3 mmol/L    CHLORIDE 108 98 - 110 mmol/L    CARBON DIOXIDE 23 20 - 32 mmol/L    CALCIUM 8.9 8.6 - 10.3 mg/dL    PHOSPHATE (AS PHOSPHORUS) 4.1 2.1 - 4.3 mg/dL    ALBUMIN 4.2 3.6  - 5.1 g/dL    Narrative    FASTING:NO    FASTING: NO   Hepatic function panel   Result Value Ref Range    PROTEIN, TOTAL 6.8 6.1 - 8.1 g/dL    ALBUMIN 4.2 3.6 - 5.1 g/dL    GLOBULIN 2.6 1.9 - 3.7 g/dL (calc)    ALBUMIN/GLOBULIN RATIO 1.6 1.0 - 2.5 (calc)    BILIRUBIN, TOTAL 0.5 0.2 - 1.2 mg/dL    BILIRUBIN, DIRECT 0.1 < OR = 0.2 mg/dL    BILIRUBIN, INDIRECT 0.4 0.2 - 1.2 mg/dL (calc)    ALKALINE PHOSPHATASE 57 35 - 144 U/L    AST 16 10 - 35 U/L    ALT 14 9 - 46 U/L    Narrative    FASTING:NO    FASTING: NO   Urinalysis with Reflex Microscopic   Result Value Ref Range    COLOR YELLOW YELLOW    APPEARANCE CLEAR CLEAR    SPECIFIC GRAVITY 1.018 1.001 - 1.035    PH 6.0 5.0 - 8.0    GLUCOSE 3+ (A) NEGATIVE    BILIRUBIN NEGATIVE NEGATIVE    KETONES NEGATIVE NEGATIVE    OCCULT BLOOD NEGATIVE NEGATIVE    PROTEIN TRACE (A) NEGATIVE    NITRITE NEGATIVE NEGATIVE    LEUKOCYTE ESTERASE NEGATIVE NEGATIVE    WBC NONE SEEN < OR = 5 /HPF    RBC NONE SEEN < OR = 2 /HPF    SQUAMOUS EPITHELIAL CELLS NONE SEEN < OR = 5 /HPF    BACTERIA NONE SEEN NONE SEEN /HPF    HYALINE CAST NONE SEEN NONE SEEN /LPF    NOTE        Comment:      This urine was analyzed for the presence of WBC,   RBC, bacteria, casts, and other formed elements.   Only those elements seen were reported.            Narrative    FASTING:NO    FASTING: NO   Prostate Spec.Ag,Screen   Result Value Ref Range    PSA, TOTAL 2.55 < OR = 4.00 ng/mL      Comment:      The total PSA value from this assay system is   standardized against the WHO standard. The test   result will be approximately 20% lower when compared   to the equimolar-standardized total PSA (Joya   Max). Comparison of serial PSA results should be   interpreted with this fact in mind.     This test was performed using the Siemens   chemiluminescent method. Values obtained from   different assay methods cannot be used  interchangeably. PSA levels, regardless of  value, should not be interpreted as  absolute  evidence of the presence or absence of disease.      Narrative    FASTING:NO    FASTING: NO     Labs look good. Kidney health is stable and doing well. No significant changes - actually improved a bit from prior baseline on this testing. Thyroid is good. Prostate testing is normal/ No current concerns.      Sincerely,        Montez Butt MD

## 2025-01-30 NOTE — ASSESSMENT & PLAN NOTE
Orders:    atorvastatin (Lipitor) 40 mg tablet; Take 1 tablet (40 mg) by mouth once daily at bedtime.    dapagliflozin propanediol (Farxiga) 10 mg; Take 1 tablet (10 mg) by mouth once daily.    torsemide (Demadex) 10 mg tablet; Take 1 tablet (10 mg) by mouth once daily.    CBC and Auto Differential; Future    TSH with reflex to Free T4 if abnormal; Future    Renal function panel; Future    Hepatic function panel; Future    Albumin-Creatinine Ratio, Urine Random; Future    Urinalysis with Reflex Microscopic; Future    Prostate Spec.Ag,Screen; Future

## 2025-01-30 NOTE — PROGRESS NOTES
Subjective   Reason for Visit: Sebastian Jean Baptiste is an 69 y.o. male here for a Medicare Wellness visit.     Past Medical, Surgical, and Family History reviewed and updated in chart.    Reviewed all medications by prescribing practitioner or clinical pharmacist (such as prescriptions, OTCs, herbal therapies and supplements) and documented in the medical record.      Patient presents today for an annual wellness exam    Medical history and surgical history updated today  Medication list reconciled and updated  Patient denies vision issues at this time  Patient denies hearing issues at this time some hearing loss.   Follows with a dentist: Yes    Patient counseled about available preventative health vaccinations and provided with opportunity to update them with our office or through prescription to preferred pharmacy.    Reviewed and discussed preventative health screening recommendations for colon cancer: due and counseled    Reviewed and discussed preventative health recommendations for screening for prostate cancer: Due and ordered    In addition to the annual wellness exam a number of issues were addressed at this time.  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  Given the classic pattern of presentation and risk factors associated with the patient's jobs empiric treatment advised if symptoms do not respond or worsen additional testing by EMG would be asked.  No history of neck pain or neck issues.  No injuries to shoulder injuries or elbow injuries.    The patient also has concerns about weight loss associated with decreased appetite.  He has no epigastric pain reflux right upper quadrant pain nausea or vomiting.  Food does not stick or catch as he swallows or eats.  He has no regurgitation.  He just feels like his appetite is diminished.  He is able to eat a meal without early satiety.  There is no  "abdominal pain with food or when he is fasting.  His weights by our scales show fluctuation but minimal overall decline in his weight.  He is not emaciated or wheezing muscle mass by my evaluation.  He has normal energy levels and denies night sweats.  There is no easy bruising or bleeding.  He has no history of thyroid disease.  He has been taking his medications as directed.  His examination shows some unusual lung sounds in the left base.  I am advising for chest x-ray and additional blood work.          Patient Care Team:  Montez Butt MD as PCP - General (Internal Medicine)  Estela Subramanian MD as PCP - Employee ACO PCP     Review of Systems   Constitutional:  Negative for chills and fever.   Respiratory:  Negative for cough and shortness of breath.    Cardiovascular:  Negative for chest pain and palpitations.   Endocrine: Negative for polydipsia and polyuria.       Objective   Vitals:  BP 95/58   Pulse 81   Temp 36.4 °C (97.6 °F) (Temporal)   Ht 1.74 m (5' 8.5\")   Wt 96.3 kg (212 lb 3.2 oz)   BMI 31.80 kg/m²       Physical Exam  Constitutional:       Appearance: Normal appearance.   HENT:      Head: Normocephalic and atraumatic.      Right Ear: Tympanic membrane normal.      Left Ear: Tympanic membrane normal.      Nose: Nose normal.      Mouth/Throat:      Mouth: Mucous membranes are moist.   Eyes:      Extraocular Movements: Extraocular movements intact.      Conjunctiva/sclera: Conjunctivae normal.      Pupils: Pupils are equal, round, and reactive to light.   Neck:      Thyroid: No thyroid mass, thyromegaly or thyroid tenderness.      Vascular: No carotid bruit.   Cardiovascular:      Rate and Rhythm: Normal rate and regular rhythm.      Heart sounds: No murmur heard.     No friction rub. No gallop.   Pulmonary:      Effort: Pulmonary effort is normal. No respiratory distress.      Breath sounds: Wheezing and rales present. No rhonchi.      Comments: Left lung base  Abdominal:      General: " Bowel sounds are normal.      Palpations: Abdomen is soft.      Tenderness: There is no abdominal tenderness. There is no guarding.      Hernia: No hernia is present.   Musculoskeletal:      Cervical back: Neck supple. No tenderness.      Right lower leg: No edema.      Left lower leg: No edema.   Lymphadenopathy:      Cervical: No cervical adenopathy.   Skin:     Coloration: Skin is not jaundiced.   Neurological:      General: No focal deficit present.      Mental Status: He is alert and oriented to person, place, and time.      Motor: No weakness.   Psychiatric:         Mood and Affect: Mood normal.         Assessment & Plan  Routine general medical examination at health care facility    Orders:    1 Year Follow Up In [facility] - PCP - Wellness Exam; Future    CBC and Auto Differential; Future    TSH with reflex to Free T4 if abnormal; Future    Renal function panel; Future    Hepatic function panel; Future    Albumin-Creatinine Ratio, Urine Random; Future    Urinalysis with Reflex Microscopic; Future    Prostate Spec.Ag,Screen; Future    Stage 3b chronic kidney disease (Multi)    Orders:    atorvastatin (Lipitor) 40 mg tablet; Take 1 tablet (40 mg) by mouth once daily at bedtime.    dapagliflozin propanediol (Farxiga) 10 mg; Take 1 tablet (10 mg) by mouth once daily.    torsemide (Demadex) 10 mg tablet; Take 1 tablet (10 mg) by mouth once daily.    CBC and Auto Differential; Future    TSH with reflex to Free T4 if abnormal; Future    Renal function panel; Future    Hepatic function panel; Future    Albumin-Creatinine Ratio, Urine Random; Future    Urinalysis with Reflex Microscopic; Future    Prostate Spec.Ag,Screen; Future    Chronic congestive heart failure, unspecified heart failure type    Orders:    atorvastatin (Lipitor) 40 mg tablet; Take 1 tablet (40 mg) by mouth once daily at bedtime.    dapagliflozin propanediol (Farxiga) 10 mg; Take 1 tablet (10 mg) by mouth once daily.    torsemide  (Demadex) 10 mg tablet; Take 1 tablet (10 mg) by mouth once daily.    CBC and Auto Differential; Future    TSH with reflex to Free T4 if abnormal; Future    Renal function panel; Future    Hepatic function panel; Future    Albumin-Creatinine Ratio, Urine Random; Future    Urinalysis with Reflex Microscopic; Future    Prostate Spec.Ag,Screen; Future    Screening for prostate cancer    Orders:    CBC and Auto Differential; Future    TSH with reflex to Free T4 if abnormal; Future    Renal function panel; Future    Hepatic function panel; Future    Albumin-Creatinine Ratio, Urine Random; Future    Urinalysis with Reflex Microscopic; Future    Prostate Spec.Ag,Screen; Future    Fatty liver    Orders:    CBC and Auto Differential; Future    TSH with reflex to Free T4 if abnormal; Future    Renal function panel; Future    Hepatic function panel; Future    Albumin-Creatinine Ratio, Urine Random; Future    Urinalysis with Reflex Microscopic; Future    Prostate Spec.Ag,Screen; Future    Chronic kidney disease, stage 4 (severe) (Multi)    Orders:    CBC and Auto Differential; Future    TSH with reflex to Free T4 if abnormal; Future    Renal function panel; Future    Hepatic function panel; Future    Albumin-Creatinine Ratio, Urine Random; Future    Urinalysis with Reflex Microscopic; Future    Prostate Spec.Ag,Screen; Future    Encounter for screening for malignant neoplasm of colon    Orders:    Colonoscopy Screening; Average Risk Patient; Future    CBC and Auto Differential; Future    TSH with reflex to Free T4 if abnormal; Future    Renal function panel; Future    Hepatic function panel; Future    Albumin-Creatinine Ratio, Urine Random; Future    Urinalysis with Reflex Microscopic; Future    Prostate Spec.Ag,Screen; Future    Abnormal weight loss    Orders:    CBC and Auto Differential; Future    TSH with reflex to Free T4 if abnormal; Future    Renal function panel; Future    Hepatic function panel; Future     Albumin-Creatinine Ratio, Urine Random; Future    Urinalysis with Reflex Microscopic; Future    Prostate Spec.Ag,Screen; Future    XR chest 2 views; Future    Abnormal lung sounds    Orders:    XR chest 2 views; Future    Carpal tunnel syndrome, bilateral

## 2025-01-30 NOTE — ASSESSMENT & PLAN NOTE
Orders:    CBC and Auto Differential; Future    TSH with reflex to Free T4 if abnormal; Future    Renal function panel; Future    Hepatic function panel; Future    Albumin-Creatinine Ratio, Urine Random; Future    Urinalysis with Reflex Microscopic; Future    Prostate Spec.Ag,Screen; Future

## 2025-01-30 NOTE — PATIENT INSTRUCTIONS
You are eligible for a vaccine for RSV, but this must come from your pharmacy per insurance. Please consider getting it.     Consider getting 2 carpal tunnel wrist braces and wear while sleeping for the next 4 to 6 weeks for treatment of your bilateral carpal tunnel disease of hands

## 2025-01-31 LAB
ALBUMIN SERPL-MCNC: 4.2 G/DL (ref 3.6–5.1)
ALBUMIN SERPL-MCNC: 4.2 G/DL (ref 3.6–5.1)
ALBUMIN/GLOB SERPL: 1.6 (CALC) (ref 1–2.5)
ALP SERPL-CCNC: 57 U/L (ref 35–144)
ALT SERPL-CCNC: 14 U/L (ref 9–46)
APPEARANCE UR: CLEAR
AST SERPL-CCNC: 16 U/L (ref 10–35)
BACTERIA #/AREA URNS HPF: ABNORMAL /HPF
BASOPHILS # BLD AUTO: 29 CELLS/UL (ref 0–200)
BASOPHILS NFR BLD AUTO: 0.6 %
BILIRUB DIRECT SERPL-MCNC: 0.1 MG/DL
BILIRUB INDIRECT SERPL-MCNC: 0.4 MG/DL (CALC) (ref 0.2–1.2)
BILIRUB SERPL-MCNC: 0.5 MG/DL (ref 0.2–1.2)
BILIRUB UR QL STRIP: NEGATIVE
BUN SERPL-MCNC: 17 MG/DL (ref 7–25)
BUN/CREAT SERPL: 10 (CALC) (ref 6–22)
CALCIUM SERPL-MCNC: 8.9 MG/DL (ref 8.6–10.3)
CHLORIDE SERPL-SCNC: 108 MMOL/L (ref 98–110)
CO2 SERPL-SCNC: 23 MMOL/L (ref 20–32)
COLOR UR: YELLOW
CREAT SERPL-MCNC: 1.77 MG/DL (ref 0.7–1.28)
EGFRCR SERPLBLD CKD-EPI 2021: 41 ML/MIN/1.73M2
EOSINOPHIL # BLD AUTO: 108 CELLS/UL (ref 15–500)
EOSINOPHIL NFR BLD AUTO: 2.2 %
ERYTHROCYTE [DISTWIDTH] IN BLOOD BY AUTOMATED COUNT: 13.7 % (ref 11–15)
GLOBULIN SER CALC-MCNC: 2.6 G/DL (CALC) (ref 1.9–3.7)
GLUCOSE SERPL-MCNC: 79 MG/DL (ref 65–139)
GLUCOSE UR QL STRIP: ABNORMAL
HCT VFR BLD AUTO: 47 % (ref 38.5–50)
HGB BLD-MCNC: 15 G/DL (ref 13.2–17.1)
HGB UR QL STRIP: NEGATIVE
HYALINE CASTS #/AREA URNS LPF: ABNORMAL /LPF
KETONES UR QL STRIP: NEGATIVE
LEUKOCYTE ESTERASE UR QL STRIP: NEGATIVE
LYMPHOCYTES # BLD AUTO: 1573 CELLS/UL (ref 850–3900)
LYMPHOCYTES NFR BLD AUTO: 32.1 %
MCH RBC QN AUTO: 27.1 PG (ref 27–33)
MCHC RBC AUTO-ENTMCNC: 31.9 G/DL (ref 32–36)
MCV RBC AUTO: 85 FL (ref 80–100)
MONOCYTES # BLD AUTO: 500 CELLS/UL (ref 200–950)
MONOCYTES NFR BLD AUTO: 10.2 %
NEUTROPHILS # BLD AUTO: 2690 CELLS/UL (ref 1500–7800)
NEUTROPHILS NFR BLD AUTO: 54.9 %
NITRITE UR QL STRIP: NEGATIVE
PH UR STRIP: 6 [PH] (ref 5–8)
PHOSPHATE SERPL-MCNC: 4.1 MG/DL (ref 2.1–4.3)
PLATELET # BLD AUTO: 217 THOUSAND/UL (ref 140–400)
PMV BLD REES-ECKER: 11.6 FL (ref 7.5–12.5)
POTASSIUM SERPL-SCNC: 4.4 MMOL/L (ref 3.5–5.3)
PROT SERPL-MCNC: 6.8 G/DL (ref 6.1–8.1)
PROT UR QL STRIP: ABNORMAL
PSA SERPL-MCNC: 2.55 NG/ML
RBC # BLD AUTO: 5.53 MILLION/UL (ref 4.2–5.8)
RBC #/AREA URNS HPF: ABNORMAL /HPF
SERVICE CMNT-IMP: ABNORMAL
SODIUM SERPL-SCNC: 143 MMOL/L (ref 135–146)
SP GR UR STRIP: 1.02 (ref 1–1.03)
SQUAMOUS #/AREA URNS HPF: ABNORMAL /HPF
TSH SERPL-ACNC: 1.39 MIU/L (ref 0.4–4.5)
WBC # BLD AUTO: 4.9 THOUSAND/UL (ref 3.8–10.8)
WBC #/AREA URNS HPF: ABNORMAL /HPF

## 2025-02-07 ENCOUNTER — OFFICE VISIT (OUTPATIENT)
Dept: CARDIOLOGY | Facility: CLINIC | Age: 70
End: 2025-02-07
Payer: MEDICARE

## 2025-02-07 VITALS
OXYGEN SATURATION: 97 % | BODY MASS INDEX: 29.41 KG/M2 | HEART RATE: 78 BPM | WEIGHT: 205.44 LBS | DIASTOLIC BLOOD PRESSURE: 52 MMHG | SYSTOLIC BLOOD PRESSURE: 87 MMHG | HEIGHT: 70 IN

## 2025-02-07 DIAGNOSIS — I50.22 ACC/AHA STAGE C CHRONIC SYSTOLIC HEART FAILURE: Primary | ICD-10-CM

## 2025-02-07 DIAGNOSIS — I50.9 CHRONIC CONGESTIVE HEART FAILURE, UNSPECIFIED HEART FAILURE TYPE: ICD-10-CM

## 2025-02-07 DIAGNOSIS — N18.32 STAGE 3B CHRONIC KIDNEY DISEASE (MULTI): ICD-10-CM

## 2025-02-07 PROCEDURE — 99214 OFFICE O/P EST MOD 30 MIN: CPT | Performed by: INTERNAL MEDICINE

## 2025-02-07 PROCEDURE — 1126F AMNT PAIN NOTED NONE PRSNT: CPT | Performed by: INTERNAL MEDICINE

## 2025-02-07 PROCEDURE — 3008F BODY MASS INDEX DOCD: CPT | Performed by: INTERNAL MEDICINE

## 2025-02-07 PROCEDURE — 93005 ELECTROCARDIOGRAM TRACING: CPT | Performed by: INTERNAL MEDICINE

## 2025-02-07 PROCEDURE — 1036F TOBACCO NON-USER: CPT | Performed by: INTERNAL MEDICINE

## 2025-02-07 PROCEDURE — 1160F RVW MEDS BY RX/DR IN RCRD: CPT | Performed by: INTERNAL MEDICINE

## 2025-02-07 PROCEDURE — 1159F MED LIST DOCD IN RCRD: CPT | Performed by: INTERNAL MEDICINE

## 2025-02-07 RX ORDER — SACUBITRIL AND VALSARTAN 49; 51 MG/1; MG/1
1 TABLET, FILM COATED ORAL 2 TIMES DAILY
Qty: 180 TABLET | Refills: 2 | Status: SHIPPED | OUTPATIENT
Start: 2025-02-07

## 2025-02-07 RX ORDER — TORSEMIDE 10 MG/1
10 TABLET ORAL DAILY
Qty: 90 TABLET | Refills: 3 | Status: SHIPPED | OUTPATIENT
Start: 2025-02-07 | End: 2026-02-07

## 2025-02-07 RX ORDER — SPIRONOLACTONE 25 MG/1
25 TABLET ORAL DAILY
Qty: 90 TABLET | Refills: 3 | Status: SHIPPED | OUTPATIENT
Start: 2025-02-07 | End: 2026-02-07

## 2025-02-07 ASSESSMENT — COLUMBIA-SUICIDE SEVERITY RATING SCALE - C-SSRS
1. IN THE PAST MONTH, HAVE YOU WISHED YOU WERE DEAD OR WISHED YOU COULD GO TO SLEEP AND NOT WAKE UP?: NO
2. HAVE YOU ACTUALLY HAD ANY THOUGHTS OF KILLING YOURSELF?: NO
6. HAVE YOU EVER DONE ANYTHING, STARTED TO DO ANYTHING, OR PREPARED TO DO ANYTHING TO END YOUR LIFE?: NO

## 2025-02-07 ASSESSMENT — ENCOUNTER SYMPTOMS
DEPRESSION: 0
LOSS OF SENSATION IN FEET: 0
OCCASIONAL FEELINGS OF UNSTEADINESS: 0

## 2025-02-07 ASSESSMENT — PAIN SCALES - GENERAL: PAINLEVEL_OUTOF10: 0-NO PAIN

## 2025-02-07 NOTE — PROGRESS NOTES
Primary Care Physician: Montez Butt MD  Date of Visit: 02/07/2025  2:00 PM EST  Location of visit: [facility] [address]     Chief Complaint:   Chief Complaint   Patient presents with    Follow-up    Coronary Artery Disease    Cardiomyopathy     CHF        HPI / Summary:   Sebastian Jean Baptiste is a 69 y.o. male presents for followup.     Last seen on August 7.  History of cardiomyopathy with severely reduced EF on reasonable GDMT.      Specialty Problems          Cardiology Problems    ACC/AHA stage C chronic systolic heart failure    CHF (congestive heart failure)    Coronary artery disease involving native coronary artery of native heart without angina pectoris    Non-ischemic cardiomyopathy (Multi)        Past Medical History:   Diagnosis Date    Other specified health status     No pertinent past medical history          Past Surgical History:   Procedure Laterality Date    OTHER SURGICAL HISTORY  10/26/2022    No history of surgery          Social History:   reports that he has never smoked. He has never been exposed to tobacco smoke. He has never used smokeless tobacco. He reports that he does not drink alcohol and does not use drugs.      Allergies:  No Known Allergies    Outpatient Medications:  Current Outpatient Medications   Medication Instructions    aspirin 81 mg chewable tablet USE AS DIRECTED.    atorvastatin (LIPITOR) 40 mg, oral, Nightly    dapagliflozin propanediol (FARXIGA) 10 mg, oral, Daily    ergocalciferol, vitamin D2, (VITAMIN D2 ORAL) 2 %, Daily    metoprolol succinate XL (TOPROL-XL) 25 mg, oral, Daily, Swallow whole. Do not chew or crush    sacubitriL-valsartan (Entresto) 49-51 mg tablet 1 tablet, oral, 2 times daily    spironolactone (ALDACTONE) 25 mg, oral, Daily    torsemide (DEMADEX) 10 mg, oral, Daily       ROS     Physical Exam:  Vitals:    02/07/25 1408   BP: 87/52   BP Location: Right arm   Patient Position: Sitting   BP Cuff Size: Large adult   Pulse: 78   SpO2: 97%   Weight: 93.2 kg (205  "lb 7 oz)   Height: 1.778 m (5' 10\")     Wt Readings from Last 5 Encounters:   02/07/25 93.2 kg (205 lb 7 oz)   01/30/25 96.3 kg (212 lb 3.2 oz)   09/09/24 101 kg (222 lb 6.4 oz)   08/07/24 98.2 kg (216 lb 9 oz)   06/12/24 99.7 kg (219 lb 12.8 oz)     Body mass index is 29.48 kg/m².   Physical Exam   Well-appearing male in no acute distress.  Skin is warm and dry.  Neck veins are flat.  Normal carotid upstrokes without bruits.  Regular rhythm without gallop or murmur.  Clear lungs without crackles soft abdomen without masses no lower extremity edema with intact pedal pulses  Last Labs:  CMP:  Recent Labs     01/30/25  1050 09/09/24  1623 04/15/24  1541 12/18/23  1510 09/25/23  1152 06/02/23  1820    138 139 140 140 139   K 4.4 4.6 4.5 4.1 4.4 4.5    101 101 102 103 105   CO2 23 30 27 26 25 23   ANIONGAP  --  12 16 16 16 16   BUN 17 32* 34* 39* 28* 43*   CREATININE 1.77* 2.17* 2.14* 2.64* 2.14* 2.91*   EGFR 41* 32* 33* 25*  --   --    GLUCOSE 79 83 92 115* 93 105*     Recent Labs     01/30/25  1050 09/09/24  1623 04/15/24  1541 12/18/23  1510 09/25/23  1152 02/25/23  1140 11/20/22  1948 09/10/22  1100 08/25/22  1500 08/15/22  2159 12/26/21  1130 07/08/19  0938 06/16/19 2000   ALBUMIN 4.2  4.2 4.5 4.8 4.8 4.8   < > 4.5 4.6 4.5 3.9 4.0   < > 4.1   ALKPHOS 57  --   --   --   --   --  58 66 80 82 46   < > 66   ALT 14  --   --   --   --   --  19 21 39 54* 22   < > 16   AST 16  --   --   --   --   --  20 17 32 37 22   < > 15   BILITOT 0.5  --   --   --   --   --  0.5 0.5 0.6 0.9 0.8   < > 0.6   LIPASE  --   --   --   --   --   --   --   --   --   --  146*  --  74    < > = values in this interval not displayed.     CBC:  Recent Labs     01/30/25  1050 02/25/23  1140 11/20/22  1948 08/25/22  1500 08/18/22  0511   WBC 4.9 6.1 6.7 6.4 6.9   HGB 15.0 16.3 16.1 17.2 15.5   HCT 47.0 50.9 49.1 52.5* 46.0    214 222 273 244   MCV 85.0 87 83 85 82     COAG: No results for input(s): \"INR\", \"DDIMERVTE\" in the last " "44604 hours.  HEME/ENDO:  Recent Labs     01/30/25  1050 08/18/22  0511   FERRITIN  --  161   IRONSAT  --  17*   TSH 1.39  --       CARDIAC:   Recent Labs     11/20/22  2114 11/20/22  1948 10/19/22  1130 08/16/22  0023 08/15/22  2159   TROPHS 20 21*  --  31* 34*   BNP  --  47 94  --  552*   No results for input(s): \"CHOL\", \"LDLF\", \"HDL\", \"TRIG\" in the last 40453 hours.    Last Cardiology Tests:  ECG:    Sinus rhythm, inferior axis/left axis deviation, LVH with QRS widening, septal Q waves,  Echo:  Echo Results:  Transthoracic Echo (TTE) Complete With Contrast 12/28/2023    [facility], [address] [place] [address]  Tel [phone] and Fax [phone]    TRANSTHORACIC ECHOCARDIOGRAM REPORT      Patient Name:      MARISELA Gruber Physician:    [id number] Javon Marin DO  Study Date:        12/28/2023          Ordering Provider:    [id number] DARRYN LLOYD  MRN/PID:           [id number]            Fellow:  Accession#:        [id number]        Nurse:                Sundeep Hart RN  Date of Birth/Age: 1955 / 68     Sonographer:          Che Mcnair RDCS  years  Gender:            M                   Additional Staff:  Height:            175.00 cm           Admit Date:           12/28/2023  Weight:            106.00 kg           Admission Status:     Outpatient  BSA:               2.20 m2             Encounter#:           [id number]  Department Location:  [facility] Non  Invasive  Blood Pressure: 97 /64 mmHg    Study Type:    TRANSTHORACIC ECHO (TTE) COMPLETE  Diagnosis/ICD: Encounter for other specified special examinations-Z01.89;  Chronic systolic (congestive) heart failure (CHF)-I50.22  Indication:    reduced EF, CHF, EF20% lost to follow up, chronic systolic heart  failure  CPT Code:      Echo Complete w Full Doppler-46236    Patient History:  Pertinent History: CHF, CAD and Cardiomyopathy.    Study Detail: The following Echo studies were performed: 2D, " M-Mode, Doppler and  color flow. Technically challenging study due to poor acoustic  windows and body habitus. Definity used as a contrast agent for  endocardial border definition. Total contrast used for this  procedure was 2 mL via IV push.      PHYSICIAN INTERPRETATION:  Left Ventricle: The left ventricular systolic function is moderately decreased, with an estimated ejection fraction of 30-35%. There is global hypokinesis of the left ventricle with minor regional variations. The left ventricular cavity size is mild to moderately dilated. Spectral Doppler shows an impaired relaxation pattern of left ventricular diastolic filling.  Left Atrium: The left atrium is normal in size.  Right Ventricle: The right ventricle is normal in size. There is normal right ventricular global systolic function.  Right Atrium: The right atrium is mildly dilated.  Aortic Valve: The aortic valve appears structurally normal. There is no evidence of aortic valve regurgitation. The peak instantaneous gradient of the aortic valve is 4.3 mmHg. The mean gradient of the aortic valve is 2.7 mmHg.  Mitral Valve: The mitral valve is normal in structure. There is mild mitral valve regurgitation.  Tricuspid Valve: The tricuspid valve is structurally normal. There is trace to mild tricuspid regurgitation. The Doppler estimated RVSP is within normal limits at 24.9 mmHg.  Pulmonic Valve: The pulmonic valve is structurally normal. There is no indication of pulmonic valve regurgitation.  Pericardium: There is no pericardial effusion noted.  Aorta: The aortic root is normal.  Systemic Veins: The inferior vena cava appears to be of normal size.  In comparison to the previous echocardiogram(s): Compared with study from 8/16/2022, LV function has improved (prior EF 20-25%).      CONCLUSIONS:  1. Left ventricular systolic function is moderately decreased with a 30-35% estimated ejection fraction.  2. Spectral Doppler shows an impaired relaxation pattern of  left ventricular diastolic filling.  3. Mild mitral valve regurgitation.  4. RVSP within normal limits.  5. There is global hypokinesis of the left ventricle with minor regional variations.    QUANTITATIVE DATA SUMMARY:  2D MEASUREMENTS:  Normal Ranges:  LAs:           5.15 cm    (2.7-4.0cm)  IVSd:          1.15 cm    (0.6-1.1cm)  LVPWd:         1.18 cm    (0.6-1.1cm)  LVIDd:         6.19 cm    (3.9-5.9cm)  LVIDs:         5.14 cm  LV Mass Index: 198.5 g/m2  LV % FS        17.0 %    LA VOLUME:  Normal Ranges:  LA Vol A4C:        57.1 ml    (22+/-6mL/m2)  LA Vol A2C:        34.6 ml  LA Vol BP:         44.5 ml  LA Vol Index A4C:  25.9 ml/m2  LA Vol Index A2C:  15.7 ml/m2  LA Vol Index BP:   20.2 ml/m2  LA Area A4C:       19.4 cm2  LA Area A2C:       15.1 cm2  LA Major Axis A4C: 5.6 cm  LA Major Axis A2C: 5.6 cm  LA Volume Index:   20.1 ml/m2  LA Vol A4C:        55.0 ml  LA Vol A2C:        33.5 ml    RA VOLUME BY A/L METHOD:  Normal Ranges:  RA Area A4C: 21.0 cm2    M-MODE MEASUREMENTS:  Normal Ranges:  Ao Root: 3.60 cm (2.0-3.7cm)  LAs:     4.58 cm (2.7-4.0cm)    AORTA MEASUREMENTS:  Normal Ranges:  Ao Sinus, d: 3.20 cm (2.1-3.5cm)  Ao STJ, d:   3.30 cm (1.7-3.4cm)  Asc Ao, d:   3.50 cm (2.1-3.4cm)    LV SYSTOLIC FUNCTION BY 2D PLANIMETRY (MOD):  Normal Ranges:  EF-A4C View: 19.9 % (>=55%)  EF-A2C View: 28.5 %  EF-Biplane:  25.1 %    LV DIASTOLIC FUNCTION:  Normal Ranges:  MV Peak E:        0.40 m/s    (0.7-1.2 m/s)  MV Peak A:        0.60 m/s    (0.42-0.7 m/s)  E/A Ratio:        0.67        (1.0-2.2)  MV lateral e'     0.05 m/s  MV medial e'      0.05 m/s  MV A Dur:         119.95 msec  PulmV Sys Magno:    32.09 cm/s  PulmV Logan Magno:   35.91 cm/s  PulmV S/D Magno:    0.89  PulmV A Revs Magno: 22.24 cm/s  PulmV A Revs Dur: 94.58 msec    MITRAL VALVE:  Normal Ranges:  MV DT: 142 msec (150-240msec)    AORTIC VALVE:  Normal Ranges:  AoV Vmax:                1.03 m/s (<=1.7m/s)  AoV Peak P.3 mmHg (<20mmHg)  AoV  Mean P.7 mmHg (1.7-11.5mmHg)  LVOT Max Magno:            0.60 m/s (<=1.1m/s)  AoV VTI:                 20.18 cm (18-25cm)  LVOT VTI:                10.16 cm  LVOT Diameter:           2.01 cm  (1.8-2.4cm)  AoV Area, VTI:           1.59 cm2 (2.5-5.5cm2)  AoV Area,Vmax:           1.85 cm2 (2.5-4.5cm2)  AoV Dimensionless Index: 0.50      RIGHT VENTRICLE:  RV Basal 4.40 cm  RV Mid   3.30 cm  RV Major 8.6 cm  TAPSE:   29.0 mm    TRICUSPID VALVE/RVSP:  Normal Ranges:  Peak TR Velocity: 2.34 m/s  Est. RA Pressure: 3 mmHg  RV Syst Pressure: 24.9 mmHg (< 30mmHg)  IVC Diam:         1.40 cm    PULMONIC VALVE:  Normal Ranges:  PV Accel Time: 97 msec  (>120ms)  PV Max Magno:    0.6 m/s  (0.6-0.9m/s)  PV Max P.4 mmHg    Pulmonary Veins:  PulmV A Revs Dur: 94.58 msec  PulmV A Revs Magno: 22.24 cm/s  PulmV Logan Magno:   35.91 cm/s  PulmV S/D Magno:    0.89  PulmV Sys Magno:    32.09 cm/s      [id number] Javon Marin DO  Electronically signed on 2023 at 1:19:43 PM        ** Final **       Cath:      Stress Test:  Stress Results:  No results found for this or any previous visit from the past 365 days.         Cardiac Imaging:  XR chest 2 views  Narrative: Interpreted By:  Mayito Barrett,   STUDY:  XR CHEST 2 VIEWS;  2025 11:10 am      INDICATION:  Signs/Symptoms:left lower lung rales and faint wheeze..      ,R63.4 Abnormal weight loss,R09.89 Other specified symptoms and signs  involving the circulatory and respiratory systems      COMPARISON:  2022      ACCESSION NUMBER(S):  [id number]      ORDERING CLINICIAN:  DARRYN LLOYD      FINDINGS:                  CARDIOMEDIASTINAL SILHOUETTE:  Cardiomediastinal silhouette is normal in size and configuration.      LUNGS:  Lungs are clear.      ABDOMEN:  No remarkable upper abdominal findings.      BONES:  No acute osseous changes.      Impression: 1.  No evidence of acute cardiopulmonary process.              MACRO:  None      Signed by: Mayito Barrett 2025  5:38 PM  Dictation workstation:   [id number]        Assessment/Plan       69-year-old male with advanced CKD stable from the perspective of his heart failure on good GDMT stable renal indices based on recent labs done by PCP recommend continuing on current regimen with office follow-up in 6 months    Orders:  No orders of the defined types were placed in this encounter.     Followup Appts:  Future Appointments   Date Time Provider Department Center   2/10/2025 10:00 AM Tremaine Brito MD [id number] [facility]           ____________________________________________________________  Steve Smalls MD    Senior Attending Physician  [facility]  [facility]

## 2025-02-10 ENCOUNTER — APPOINTMENT (OUTPATIENT)
Dept: NEPHROLOGY | Facility: CLINIC | Age: 70
End: 2025-02-10
Payer: MEDICARE

## 2025-02-10 VITALS
OXYGEN SATURATION: 94 % | TEMPERATURE: 97.2 F | WEIGHT: 204.8 LBS | BODY MASS INDEX: 29.32 KG/M2 | HEIGHT: 70 IN | HEART RATE: 70 BPM | SYSTOLIC BLOOD PRESSURE: 98 MMHG | DIASTOLIC BLOOD PRESSURE: 62 MMHG

## 2025-02-10 DIAGNOSIS — I50.9 CHRONIC CONGESTIVE HEART FAILURE, UNSPECIFIED HEART FAILURE TYPE: ICD-10-CM

## 2025-02-10 DIAGNOSIS — N18.32 STAGE 3B CHRONIC KIDNEY DISEASE (MULTI): ICD-10-CM

## 2025-02-10 PROCEDURE — 1159F MED LIST DOCD IN RCRD: CPT | Performed by: INTERNAL MEDICINE

## 2025-02-10 PROCEDURE — 1036F TOBACCO NON-USER: CPT | Performed by: INTERNAL MEDICINE

## 2025-02-10 PROCEDURE — 3008F BODY MASS INDEX DOCD: CPT | Performed by: INTERNAL MEDICINE

## 2025-02-10 PROCEDURE — 99213 OFFICE O/P EST LOW 20 MIN: CPT | Performed by: INTERNAL MEDICINE

## 2025-02-10 PROCEDURE — 1126F AMNT PAIN NOTED NONE PRSNT: CPT | Performed by: INTERNAL MEDICINE

## 2025-02-10 RX ORDER — TORSEMIDE 10 MG/1
10 TABLET ORAL DAILY
Qty: 90 TABLET | Refills: 3 | Status: SHIPPED | OUTPATIENT
Start: 2025-02-10 | End: 2026-02-10

## 2025-02-10 RX ORDER — SPIRONOLACTONE 25 MG/1
25 TABLET ORAL DAILY
Qty: 90 TABLET | Refills: 3 | Status: SHIPPED | OUTPATIENT
Start: 2025-02-10 | End: 2026-02-10

## 2025-02-10 ASSESSMENT — PAIN SCALES - GENERAL: PAINLEVEL_OUTOF10: 0-NO PAIN

## 2025-02-10 NOTE — PROGRESS NOTES
"For follow up, doing well.  C/o feeling more tired than in the past, but able to go up and down 2 flights of 13 stairs without stopping  Has been losing weight involuntarily despite good appetite, w/up ongoing with PCP  No hospitalizations/illness since last visit  Not checking BP at home  Denies orthostatic symptoms    RoS negative for all other systems except as noted above.        4/15/2024     3:13 PM 6/12/2024     2:29 PM 8/7/2024     2:19 PM 9/9/2024     3:37 PM 1/30/2025     9:54 AM 2/7/2025     2:08 PM 2/10/2025    10:11 AM   Vitals   Systolic 100 101 84 108 95 87 98   Diastolic 66 68 57 66 58 52 62   BP Location Right arm Left arm Left arm Right arm  Right arm Right arm   Heart Rate 91 73 79 78 81 78 70   Temp 36.6 °C (97.8 °F) 36.3 °C (97.4 °F)  36.6 °C (97.8 °F) 36.4 °C (97.6 °F)  36.2 °C (97.2 °F)   Height 1.778 m (5' 10\")  1.778 m (5' 10\")  1.74 m (5' 8.5\") 1.778 m (5' 10\") 1.778 m (5' 10\")   Weight (lb) 220 219.8 216.56 222.4 212.2 205.44 204.8   BMI 31.57 kg/m2 31.54 kg/m2 31.07 kg/m2 31.91 kg/m2 31.8 kg/m2 29.48 kg/m2 29.39 kg/m2   BSA (m2) 2.22 m2 2.22 m2 2.2 m2 2.23 m2 2.16 m2 2.15 m2 2.14 m2   Visit Report Report Report Report Report Report Report Report     No distress  HEENT:  moist, no pallor  No edema filiberto LE  Breath sounds filiberto equal, clear  S1 S2 regular, normal, no rub or murmur  Abdomen soft  AAO x3, non focal    Lab Results   Component Value Date     01/30/2025     09/09/2024     04/15/2024    K 4.4 01/30/2025    K 4.6 09/09/2024    K 4.5 04/15/2024     01/30/2025     09/09/2024     04/15/2024    CO2 23 01/30/2025    CO2 30 09/09/2024    CO2 27 04/15/2024    BUN 17 01/30/2025    BUN 32 (H) 09/09/2024    BUN 34 (H) 04/15/2024    CREATININE 1.77 (H) 01/30/2025    CREATININE 2.17 (H) 09/09/2024    CREATININE 2.14 (H) 04/15/2024    CALCIUM 8.9 01/30/2025    CALCIUM 9.5 09/09/2024    CALCIUM 9.9 04/15/2024    PHOS 4.1 01/30/2025    PHOS 4.5 09/09/2024    PHOS " 3.2 04/15/2024    VITD25 62 09/09/2024    VITD25 27 (L) 12/18/2023    VITD25 28 (A) 09/25/2023    MICROALBCREA 8.3 09/09/2024    MICROALBCREA 42.6 (H) 04/15/2024    MICROALBCREA  12/18/2023      Comment:      One or more analytes used in this calculation is outside of the analytical measurement range. Calculation cannot be performed.    HGB 15.0 01/30/2025    HGB 16.3 02/25/2023    HGB 16.1 11/20/2022      Current Outpatient Medications   Medication Instructions    aspirin 81 mg chewable tablet USE AS DIRECTED.    atorvastatin (LIPITOR) 40 mg, oral, Nightly    dapagliflozin propanediol (FARXIGA) 10 mg, oral, Daily    ergocalciferol, vitamin D2, (VITAMIN D2 ORAL) 2 %, Daily    metoprolol succinate XL (TOPROL-XL) 25 mg, oral, Daily, Swallow whole. Do not chew or crush    sacubitriL-valsartan (Entresto) 49-51 mg tablet 1 tablet, oral, 2 times daily    spironolactone (ALDACTONE) 25 mg, oral, Daily    torsemide (DEMADEX) 10 mg, oral, Daily    69-year-old with history of nonischemic cardiomyopathy with HFrEF and CKD G3A1.     #CKD G3A1: Labs from 1/30/25 show creatinine 1.7 mg per DL, EGFR 41 ml/min, stable. Knows to be cautious with NSAIDs. Continue Farxiga 10 mg/day, tolerating well. Also on ARNI, BB and MRA as part of GDMT.     #Bone mineral metabolism: Ca 8.9, PO4 4.1, albumin 4.2, , Ct daily over-the-counter vitamin D 1000 units.        #Hypertension: Goal blood pressure 120/80 mmHg, low.. Denies orthostatic symptoms but endorses fatigue. Will defer modification of GDMT to cardiologist.For now, continue Entresto 49-51 mg 1 tablet twice a day, metoprolol succinate ER 25 mg one tablet daily, spironolactone 25 mg daily- also takes Torsemide 10 mg daily.Continue 2.5 g/day sodium restricted diet. Spironolactone and Torsemide refilled.    #Involuntary weight loss: Following with PCP to work up.     RTC: 3-4 mo with repeat labs today      [employer] school

## 2025-02-13 LAB
ATRIAL RATE: 74 BPM
P AXIS: 75 DEGREES
P OFFSET: 184 MS
P ONSET: 123 MS
PR INTERVAL: 154 MS
Q ONSET: 200 MS
QRS COUNT: 12 BEATS
QRS DURATION: 130 MS
QT INTERVAL: 420 MS
QTC CALCULATION(BAZETT): 466 MS
QTC FREDERICIA: 450 MS
R AXIS: -68 DEGREES
T AXIS: 76 DEGREES
T OFFSET: 410 MS
VENTRICULAR RATE: 74 BPM

## 2025-02-23 DIAGNOSIS — N18.32 STAGE 3B CHRONIC KIDNEY DISEASE (MULTI): ICD-10-CM

## 2025-02-23 DIAGNOSIS — I50.9 CHRONIC CONGESTIVE HEART FAILURE, UNSPECIFIED HEART FAILURE TYPE: ICD-10-CM

## 2025-02-24 RX ORDER — TORSEMIDE 10 MG/1
TABLET ORAL
Qty: 45 TABLET | Refills: 2 | OUTPATIENT
Start: 2025-02-24

## 2025-02-27 DIAGNOSIS — I50.9 CHRONIC CONGESTIVE HEART FAILURE, UNSPECIFIED HEART FAILURE TYPE: ICD-10-CM

## 2025-02-27 DIAGNOSIS — N18.32 STAGE 3B CHRONIC KIDNEY DISEASE (MULTI): ICD-10-CM

## 2025-04-30 DIAGNOSIS — Z00.00 ROUTINE GENERAL MEDICAL EXAMINATION AT HEALTH CARE FACILITY: ICD-10-CM

## 2025-04-30 DIAGNOSIS — R63.4 ABNORMAL WEIGHT LOSS: ICD-10-CM

## 2025-04-30 DIAGNOSIS — Z12.5 SCREENING FOR PROSTATE CANCER: ICD-10-CM

## 2025-04-30 DIAGNOSIS — I50.9 CHRONIC CONGESTIVE HEART FAILURE, UNSPECIFIED HEART FAILURE TYPE: ICD-10-CM

## 2025-04-30 DIAGNOSIS — N18.4 CHRONIC KIDNEY DISEASE, STAGE 4 (SEVERE) (MULTI): ICD-10-CM

## 2025-04-30 DIAGNOSIS — K76.0 FATTY LIVER: ICD-10-CM

## 2025-04-30 DIAGNOSIS — Z12.11 ENCOUNTER FOR SCREENING FOR MALIGNANT NEOPLASM OF COLON: ICD-10-CM

## 2025-04-30 DIAGNOSIS — N18.32 STAGE 3B CHRONIC KIDNEY DISEASE (MULTI): ICD-10-CM

## 2025-05-31 DIAGNOSIS — N18.32 STAGE 3B CHRONIC KIDNEY DISEASE (MULTI): ICD-10-CM

## 2025-05-31 DIAGNOSIS — I50.9 CHRONIC CONGESTIVE HEART FAILURE, UNSPECIFIED HEART FAILURE TYPE: ICD-10-CM

## 2025-06-02 RX ORDER — METOPROLOL SUCCINATE 25 MG/1
25 TABLET, EXTENDED RELEASE ORAL DAILY
Qty: 90 TABLET | Refills: 1 | Status: SHIPPED | OUTPATIENT
Start: 2025-06-02

## 2025-06-04 DIAGNOSIS — N18.32 STAGE 3B CHRONIC KIDNEY DISEASE (MULTI): Primary | ICD-10-CM

## 2025-06-09 ENCOUNTER — APPOINTMENT (OUTPATIENT)
Dept: NEPHROLOGY | Facility: CLINIC | Age: 70
End: 2025-06-09
Payer: MEDICARE

## 2025-07-15 ENCOUNTER — APPOINTMENT (OUTPATIENT)
Dept: PRIMARY CARE | Facility: CLINIC | Age: 70
End: 2025-07-15
Payer: MEDICARE

## 2025-07-15 VITALS — TEMPERATURE: 96.2 F | DIASTOLIC BLOOD PRESSURE: 64 MMHG | HEART RATE: 61 BPM | SYSTOLIC BLOOD PRESSURE: 98 MMHG

## 2025-07-15 DIAGNOSIS — I50.22 ACC/AHA STAGE C CHRONIC SYSTOLIC HEART FAILURE: Primary | ICD-10-CM

## 2025-07-15 DIAGNOSIS — Z13.6 SCREENING FOR CARDIOVASCULAR CONDITION: ICD-10-CM

## 2025-07-15 DIAGNOSIS — R73.9 HYPERGLYCEMIA: ICD-10-CM

## 2025-07-15 DIAGNOSIS — N18.32 STAGE 3B CHRONIC KIDNEY DISEASE (MULTI): ICD-10-CM

## 2025-07-15 DIAGNOSIS — I50.9 CHRONIC CONGESTIVE HEART FAILURE, UNSPECIFIED HEART FAILURE TYPE: ICD-10-CM

## 2025-07-15 PROCEDURE — 1036F TOBACCO NON-USER: CPT | Performed by: INTERNAL MEDICINE

## 2025-07-15 PROCEDURE — 1126F AMNT PAIN NOTED NONE PRSNT: CPT | Performed by: INTERNAL MEDICINE

## 2025-07-15 PROCEDURE — 99214 OFFICE O/P EST MOD 30 MIN: CPT | Performed by: INTERNAL MEDICINE

## 2025-07-15 PROCEDURE — 1160F RVW MEDS BY RX/DR IN RCRD: CPT | Performed by: INTERNAL MEDICINE

## 2025-07-15 PROCEDURE — G2211 COMPLEX E/M VISIT ADD ON: HCPCS | Performed by: INTERNAL MEDICINE

## 2025-07-15 PROCEDURE — 1159F MED LIST DOCD IN RCRD: CPT | Performed by: INTERNAL MEDICINE

## 2025-07-15 RX ORDER — ATORVASTATIN CALCIUM 40 MG/1
40 TABLET, FILM COATED ORAL NIGHTLY
Qty: 90 TABLET | Refills: 3 | Status: SHIPPED | OUTPATIENT
Start: 2025-07-15 | End: 2026-07-15

## 2025-07-15 ASSESSMENT — ENCOUNTER SYMPTOMS
POLYDIPSIA: 0
SHORTNESS OF BREATH: 0
CHILLS: 0
COUGH: 0
FEVER: 0
FATIGUE: 0
UNEXPECTED WEIGHT CHANGE: 0
ORTHOPNEA: 0
PALPITATIONS: 0
EDEMA: 0

## 2025-07-15 ASSESSMENT — PAIN SCALES - GENERAL: PAINLEVEL_OUTOF10: 0-NO PAIN

## 2025-07-15 NOTE — PROGRESS NOTES
Subjective   Patient ID: Sebastian Jean Baptiste is a 69 y.o. male who presents for Follow-up.    C scope for aug 8th.     Denies complications from meds. Working to stay hydrated with summer heat. Following medical ordered. '    Patient is otherwise in his usual state of health and has no acute issues at this time or concerns to address.  He is asymptomatic from the CHF perspective his renal function is clinically stable he continues following with his standard specialist.  I have reviewed his blood panel c-Met his nephrologist has his blood work pending for the later part of the year and added a few things on for routine monitoring.    Congestive Heart Failure  Presents for follow-up visit. Pertinent negatives include no chest pain, edema, fatigue, muscle weakness, orthopnea, palpitations, paroxysmal nocturnal dyspnea, shortness of breath or unexpected weight change. The symptoms have been stable. Compliance with total regimen is %. Compliance problems include adherence to diet.  Compliance with diet is %. Compliance with exercise is %. Compliance with medications is %.        Review of Systems   Constitutional:  Negative for chills, fatigue, fever and unexpected weight change.   Respiratory:  Negative for cough and shortness of breath.    Cardiovascular:  Negative for chest pain and palpitations.   Endocrine: Negative for polydipsia and polyuria.   Musculoskeletal:  Negative for muscle weakness.       Objective   BP 98/64 (BP Location: Left arm, Patient Position: Sitting, BP Cuff Size: Adult)   Pulse 61   Temp 35.7 °C (96.2 °F) (Temporal)     Physical Exam  Constitutional:       Appearance: Normal appearance.   HENT:      Head: Normocephalic and atraumatic.   Eyes:      Extraocular Movements: Extraocular movements intact.      Pupils: Pupils are equal, round, and reactive to light.   Neck:      Thyroid: No thyroid mass or thyromegaly.      Vascular: No carotid bruit.   Cardiovascular:      Rate  and Rhythm: Normal rate and regular rhythm.      Heart sounds: No murmur heard.     No friction rub. No gallop.   Pulmonary:      Effort: No respiratory distress.      Breath sounds: No wheezing, rhonchi or rales.   Musculoskeletal:      Cervical back: Neck supple.      Right lower leg: No edema.      Left lower leg: No edema.   Lymphadenopathy:      Cervical: No cervical adenopathy.   Neurological:      Mental Status: He is alert.         Assessment/Plan

## 2025-08-05 ENCOUNTER — ANESTHESIA EVENT (OUTPATIENT)
Dept: GASTROENTEROLOGY | Facility: HOSPITAL | Age: 70
End: 2025-08-05
Payer: MEDICARE

## 2025-08-05 ENCOUNTER — APPOINTMENT (OUTPATIENT)
Dept: CARDIOLOGY | Facility: CLINIC | Age: 70
End: 2025-08-05
Payer: MEDICARE

## 2025-08-05 ENCOUNTER — ANESTHESIA (OUTPATIENT)
Dept: GASTROENTEROLOGY | Facility: HOSPITAL | Age: 70
End: 2025-08-05
Payer: MEDICARE

## 2025-08-05 ENCOUNTER — HOSPITAL ENCOUNTER (OUTPATIENT)
Dept: GASTROENTEROLOGY | Facility: HOSPITAL | Age: 70
Discharge: HOME | End: 2025-08-05
Payer: MEDICARE

## 2025-08-05 VITALS
DIASTOLIC BLOOD PRESSURE: 85 MMHG | HEIGHT: 70 IN | SYSTOLIC BLOOD PRESSURE: 125 MMHG | WEIGHT: 207.23 LBS | OXYGEN SATURATION: 98 % | BODY MASS INDEX: 29.67 KG/M2 | RESPIRATION RATE: 18 BRPM | HEART RATE: 69 BPM | TEMPERATURE: 97.2 F

## 2025-08-05 DIAGNOSIS — Z12.11 ENCOUNTER FOR SCREENING FOR MALIGNANT NEOPLASM OF COLON: ICD-10-CM

## 2025-08-05 PROCEDURE — G0105 COLORECTAL SCRN; HI RISK IND: HCPCS | Performed by: INTERNAL MEDICINE

## 2025-08-05 PROCEDURE — 7100000009 HC PHASE TWO TIME - INITIAL BASE CHARGE

## 2025-08-05 PROCEDURE — 3700000001 HC GENERAL ANESTHESIA TIME - INITIAL BASE CHARGE

## 2025-08-05 PROCEDURE — A45378 PR COLONOSCOPY,DIAGNOSTIC: Performed by: ANESTHESIOLOGIST ASSISTANT

## 2025-08-05 PROCEDURE — 7100000010 HC PHASE TWO TIME - EACH INCREMENTAL 1 MINUTE

## 2025-08-05 PROCEDURE — 2500000004 HC RX 250 GENERAL PHARMACY W/ HCPCS (ALT 636 FOR OP/ED): Performed by: ANESTHESIOLOGIST ASSISTANT

## 2025-08-05 PROCEDURE — 2500000005 HC RX 250 GENERAL PHARMACY W/O HCPCS: Performed by: ANESTHESIOLOGY

## 2025-08-05 PROCEDURE — A45378 PR COLONOSCOPY,DIAGNOSTIC: Performed by: ANESTHESIOLOGY

## 2025-08-05 PROCEDURE — G0121 COLON CA SCRN NOT HI RSK IND: HCPCS | Performed by: INTERNAL MEDICINE

## 2025-08-05 PROCEDURE — 3700000002 HC GENERAL ANESTHESIA TIME - EACH INCREMENTAL 1 MINUTE

## 2025-08-05 RX ORDER — PROPOFOL 10 MG/ML
INJECTION, EMULSION INTRAVENOUS AS NEEDED
Status: DISCONTINUED | OUTPATIENT
Start: 2025-08-05 | End: 2025-08-05

## 2025-08-05 RX ORDER — CIPROFLOXACIN HYDROCHLORIDE 3 MG/ML
1 SOLUTION/ DROPS OPHTHALMIC 4 TIMES DAILY
Status: DISCONTINUED | OUTPATIENT
Start: 2025-08-05 | End: 2025-08-06 | Stop reason: HOSPADM

## 2025-08-05 RX ADMIN — CIPROFLOXACIN HYDROCHLORIDE 1 DROP: 3 SOLUTION/ DROPS OPHTHALMIC at 13:43

## 2025-08-05 RX ADMIN — PROPOFOL 60 MG: 10 INJECTION, EMULSION INTRAVENOUS at 12:33

## 2025-08-05 RX ADMIN — PROPOFOL 150 MCG/KG/MIN: 10 INJECTION, EMULSION INTRAVENOUS at 12:34

## 2025-08-05 RX ADMIN — DICLOFENAC SODIUM 1 DROP: 1 SOLUTION/ DROPS OPHTHALMIC at 13:43

## 2025-08-05 ASSESSMENT — PAIN - FUNCTIONAL ASSESSMENT
PAIN_FUNCTIONAL_ASSESSMENT: 0-10

## 2025-08-05 ASSESSMENT — PAIN SCALES - GENERAL
PAINLEVEL_OUTOF10: 0 - NO PAIN

## 2025-08-05 NOTE — ANESTHESIA POSTPROCEDURE EVALUATION
Patient: Sebastian Jean Baptiste    Procedure Summary       Date: 08/05/25 Room / Location: [facility]    Anesthesia Start: 1232 Anesthesia Stop: 1259    Procedure: COLONOSCOPY Diagnosis: Encounter for screening for malignant neoplasm of colon    Scheduled Providers: Roberto Lopez MD; Wili Sawyer MD; GETACHEW Naranjo; Keiko Ghosh RN Responsible Provider: Wili Sawyer MD    Anesthesia Type: MAC ASA Status: 3            Anesthesia Type: MAC    Vitals Value Taken Time   /85 08/05/25 13:48   Temp 36.2 °C (97.2 °F) 08/05/25 13:48   Pulse 69 08/05/25 13:48   Resp 18 08/05/25 13:48   SpO2 98 % 08/05/25 13:48       Anesthesia Post Evaluation    Patient location during evaluation: PACU  Patient participation: complete - patient participated  Level of consciousness: awake and alert  Pain management: adequate  Airway patency: patent  Cardiovascular status: acceptable and hemodynamically stable  Respiratory status: acceptable, spontaneous ventilation and nonlabored ventilation  Hydration status: acceptable  Postoperative Nausea and Vomiting: none        Encounter Notable Events   Notable Event Outcome Phase Comment   Corneal abrasion Ongoing Treatment Intraprocedure Possible left corneal abrasion with pain and redness of left eye.  Treated symptomatically with cipro/diclofenac eye drops x2 days.

## 2025-08-05 NOTE — H&P
"History Of Present Illness  Sebastian Jean Baptiste is a 69 y.o. male presenting here for open-access colonoscopy for \"average risk screening for colorectal cancer\". The patient was previously seen by Dr Ayala in 2019 for sigmoid colon diverticulitis with recommendation at that time for colonoscopy which patient declined at that time.     Past Medical History  Medical History[1]  Surgical History  Surgical History[2]  Social History  He reports that he has never smoked. He has never been exposed to tobacco smoke. He has never used smokeless tobacco. He reports that he does not currently use alcohol. He reports that he does not use drugs.    Family History  Family History[3]     Allergies  Allergies[4]       Physical Exam  Constitutional:       Appearance: He is obese.   HENT:      Mouth/Throat:      Mouth: Mucous membranes are moist.     Eyes:      Conjunctiva/sclera: Conjunctivae normal.       Cardiovascular:      Rate and Rhythm: Normal rate.   Pulmonary:      Effort: Pulmonary effort is normal.   Abdominal:      Palpations: Abdomen is soft.     Skin:     General: Skin is warm.     Neurological:      Mental Status: He is oriented to person, place, and time.     Psychiatric:         Mood and Affect: Mood normal.          Last Recorded Vitals  Blood pressure 124/81, pulse 84, temperature 36 °C (96.8 °F), temperature source Temporal, resp. rate 16, height 1.778 m (5' 10\"), weight 94 kg (207 lb 3.7 oz), SpO2 98%.    Assessment/Plan   open-access colonoscopy for \"average risk screening for colorectal cancer\". The patient was previously seen by Dr Ayala in 2019 for sigmoid colon diverticulitis with recommendation at that time for colonoscopy which patient declined at that time.     Roberto Lopez MD       [1]   Past Medical History:  Diagnosis Date    CAD (coronary artery disease)     CHF (congestive heart failure)     CKD (chronic kidney disease)     Hyperlipidemia     Hypertension    [2]   Past " Surgical History:  Procedure Laterality Date    CARDIAC CATHETERIZATION  08/18/2022    LAD    50% stenosis mid to distal    QUADRICEPS REPAIR Left    [3]   Family History  Problem Relation Name Age of Onset    Colon cancer Mother     [4] No Known Allergies

## 2025-08-05 NOTE — NURSING NOTE
1325- Patient dressing, Declines assistance. IV removed intact.     1328- Patient states left eye is watering and scratchy. Dr. Sawyer notified.    1345- Dr. Sawyer at bedside.     1348- Discharge instructions provided to pt and family. Educated on medications, effects of anesthesia, and homecoming care. Pt and family verbalizing understanding of all instructions provided at this time. All questions and concerns answered. Pt given contact information for provider.     1356- Pt assisted to main lobby via  by transport. Dc in stable condition to home. All belongings taken with pt.

## 2025-08-05 NOTE — DISCHARGE INSTRUCTIONS

## 2025-08-05 NOTE — ANESTHESIA PREPROCEDURE EVALUATION
"Patient: Sebastian Jean Baptiste    Procedure Information       Date/Time: 08/05/25 1100    Scheduled providers: Roberto Lopez MD; Wili Sawyer MD; GETACHEW Naranjo; Keiko Ghosh RN    Procedure: COLONOSCOPY    Location: [facility]            Relevant Problems   Cardiac   (+) CHF (congestive heart failure)   (+) Coronary artery disease involving native coronary artery of native heart without angina pectoris      Liver   (+) Fatty liver       Clinical information reviewed:   Tobacco  Allergies  Meds   Med Hx  Surg Hx   Fam Hx  Soc Hx         Medical History[1]   Surgical History[2]  Social History[3]   Current Outpatient Medications   Medication Instructions    aspirin 81 mg chewable tablet USE AS DIRECTED.    atorvastatin (LIPITOR) 40 mg, oral, Nightly    empagliflozin (JARDIANCE) 25 mg, oral, Daily    ergocalciferol, vitamin D2, (VITAMIN D2 ORAL) 2 %, Daily    metoprolol succinate XL (TOPROL-XL) 25 mg, oral, Daily, Swallow whole. Do not chew or crush    sacubitriL-valsartan (Entresto) 49-51 mg tablet 1 tablet, oral, 2 times daily    spironolactone (ALDACTONE) 25 mg, oral, Daily    torsemide (DEMADEX) 10 mg, oral, Daily      RX Allergies[4]     Chemistry    Lab Results   Component Value Date/Time     01/30/2025 1050    K 4.4 01/30/2025 1050     01/30/2025 1050    CO2 23 01/30/2025 1050    BUN 17 01/30/2025 1050    CREATININE 1.77 (H) 01/30/2025 1050    Lab Results   Component Value Date/Time    CALCIUM 8.9 01/30/2025 1050    ALKPHOS 57 01/30/2025 1050    AST 16 01/30/2025 1050    ALT 14 01/30/2025 1050    BILITOT 0.5 01/30/2025 1050          No results found for: \"HGBA1C\"  Lab Results   Component Value Date/Time    WBC 4.9 01/30/2025 1050    HGB 15.0 01/30/2025 1050    HCT 47.0 01/30/2025 1050     01/30/2025 1050     No results found for: \"PROTIME\", \"PTT\", \"INR\"  Encounter Date: 02/07/25   ECG 12 lead (Clinic Performed)   Result Value    Ventricular Rate 74    Atrial " Rate 74    CA Interval 154    QRS Duration 130    QT Interval 420    QTC Calculation(Bazett) 466    P Axis 75    R Axis -68    T Axis 76    QRS Count 12    Q Onset 200    P Onset 123    P Offset 184    T Offset 410    QTC Fredericia 450    Narrative    Normal sinus rhythm  Left axis deviation  Left ventricular hypertrophy with QRS widening  Cannot rule out Anteroseptal infarct (cited on or before 30-JAN-2024)  Abnormal ECG  When compared with ECG of 30-JAN-2024 14:30,  Right bundle branch block is no longer Present  Confirmed by Steve Smalls ([id number]) on 2/13/2025 10:01:42 AM        Transthoracic Echo (TTE) Complete 12/28/2023  PHYSICIAN INTERPRETATION:  Left Ventricle: The left ventricular systolic function is moderately decreased, with an estimated ejection fraction of 30-35%. There is global hypokinesis of the left ventricle with minor regional variations. The left ventricular cavity size is mild to moderately dilated. Spectral Doppler shows an impaired relaxation pattern of left ventricular diastolic filling.  Left Atrium: The left atrium is normal in size.  Right Ventricle: The right ventricle is normal in size. There is normal right ventricular global systolic function.  Right Atrium: The right atrium is mildly dilated.  Aortic Valve: The aortic valve appears structurally normal. There is no evidence of aortic valve regurgitation. The peak instantaneous gradient of the aortic valve is 4.3 mmHg. The mean gradient of the aortic valve is 2.7 mmHg.  Mitral Valve: The mitral valve is normal in structure. There is mild mitral valve regurgitation.  Tricuspid Valve: The tricuspid valve is structurally normal. There is trace to mild tricuspid regurgitation. The Doppler estimated RVSP is within normal limits at 24.9 mmHg.  Pulmonic Valve: The pulmonic valve is structurally normal. There is no indication of pulmonic valve regurgitation.  Pericardium: There is no pericardial effusion noted.  Aorta: The aortic root is  "normal.  Systemic Veins: The inferior vena cava appears to be of normal size.  In comparison to the previous echocardiogram(s): Compared with study from 8/16/2022, LV function has improved (prior EF 20-25%).    CONCLUSIONS:  1. Left ventricular systolic function is moderately decreased with a 30-35% estimated ejection fraction.  2. Spectral Doppler shows an impaired relaxation pattern of left ventricular diastolic filling.  3. Mild mitral valve regurgitation.  4. RVSP within normal limits.  5. There is global hypokinesis of the left ventricle with minor regional variations.    Cath 8/18/2022:  Coronary Lesion Summary:  Vessel   Stenosis   Vessel Segment  LAD    50% stenosis mid to distal  CONCLUSIONS:   1. Nonobstructive CAD in a left dominant system.   2. Elevated left and right heart filling pressures.   3. Mild pulmonary hypertension.   4. Preserved cardiac index.   5. No evidence of intracardiac shunt.   6. No evidence of aortic stenosis.    Visit Vitals  /81   Pulse 84   Temp 36 °C (96.8 °F) (Temporal)   Resp 16   Ht 1.778 m (5' 10\")   Wt 94 kg (207 lb 3.7 oz)   SpO2 98%   BMI 29.73 kg/m²   Smoking Status Never   BSA 2.15 m²     NPO/Void Status  Date of Last Liquid: 08/05/25  Time of Last Liquid: 0400  Date of Last Solid: 08/03/25  Time of Last Solid: 1700  Last Intake Type: Clear fluids        Physical Exam    Airway  Mallampati: II  TM distance: >3 FB  Neck ROM: full  Mouth opening: 3 or more finger widths     Cardiovascular - normal exam  Rhythm: regular  Rate: normal     Dental    Pulmonary - normal exam   Abdominal - normal exam           Anesthesia Plan    History of general anesthesia?: yes  History of complications of general anesthesia?: no    ASA 3     MAC   (Standard ASA monitoring. Discussed the possibility of transient awareness and recall with the patient who understands and wishes to proceed.)  Anesthetic plan and risks discussed with patient.    Plan discussed with CRNA and CAA.         "     [1]   Past Medical History:  Diagnosis Date    CAD (coronary artery disease)     CHF (congestive heart failure)     CKD (chronic kidney disease)     Hyperlipidemia     Hypertension    [2]   Past Surgical History:  Procedure Laterality Date    CARDIAC CATHETERIZATION  08/18/2022    LAD    50% stenosis mid to distal    COLONOSCOPY      QUADRICEPS REPAIR Left    [3]   Social History  Tobacco Use    Smoking status: Never     Passive exposure: Never    Smokeless tobacco: Never   Vaping Use    Vaping status: Never Used   Substance Use Topics    Alcohol use: Not Currently     Comment: very occassional    Drug use: Never   [4] No Known Allergies

## 2025-08-06 ASSESSMENT — PAIN SCALES - GENERAL: PAINLEVEL_OUTOF10: 0 - NO PAIN

## 2025-08-08 ENCOUNTER — APPOINTMENT (OUTPATIENT)
Dept: CARDIOLOGY | Facility: CLINIC | Age: 70
End: 2025-08-08
Payer: MEDICARE

## 2025-08-30 DIAGNOSIS — I50.9 CHRONIC CONGESTIVE HEART FAILURE, UNSPECIFIED HEART FAILURE TYPE: ICD-10-CM

## 2025-08-30 DIAGNOSIS — N18.32 STAGE 3B CHRONIC KIDNEY DISEASE (MULTI): ICD-10-CM

## 2025-09-02 RX ORDER — METOPROLOL SUCCINATE 25 MG/1
25 TABLET, EXTENDED RELEASE ORAL DAILY
Qty: 90 TABLET | Refills: 1 | Status: SHIPPED | OUTPATIENT
Start: 2025-09-02